# Patient Record
Sex: MALE | Race: WHITE | NOT HISPANIC OR LATINO | Employment: OTHER | URBAN - METROPOLITAN AREA
[De-identification: names, ages, dates, MRNs, and addresses within clinical notes are randomized per-mention and may not be internally consistent; named-entity substitution may affect disease eponyms.]

---

## 2022-12-13 ENCOUNTER — APPOINTMENT (OUTPATIENT)
Dept: GENERAL RADIOLOGY | Facility: HOSPITAL | Age: 67
End: 2022-12-13

## 2022-12-13 ENCOUNTER — APPOINTMENT (OUTPATIENT)
Dept: CT IMAGING | Facility: HOSPITAL | Age: 67
End: 2022-12-13

## 2022-12-13 ENCOUNTER — HOSPITAL ENCOUNTER (INPATIENT)
Facility: HOSPITAL | Age: 67
LOS: 2 days | Discharge: HOME | End: 2022-12-15
Attending: EMERGENCY MEDICINE | Admitting: INTERNAL MEDICINE

## 2022-12-13 DIAGNOSIS — S00.83XA CONTUSION OF FACE, INITIAL ENCOUNTER: ICD-10-CM

## 2022-12-13 DIAGNOSIS — I48.91 NEW ONSET A-FIB: ICD-10-CM

## 2022-12-13 DIAGNOSIS — R55 SYNCOPE, UNSPECIFIED SYNCOPE TYPE: Primary | ICD-10-CM

## 2022-12-13 DIAGNOSIS — I10 HYPERTENSION, UNSPECIFIED TYPE: ICD-10-CM

## 2022-12-13 DIAGNOSIS — I48.91 ATRIAL FIBRILLATION WITH RVR: ICD-10-CM

## 2022-12-13 DIAGNOSIS — I60.9 SAH (SUBARACHNOID HEMORRHAGE): ICD-10-CM

## 2022-12-13 DIAGNOSIS — S06.5XAA SDH (SUBDURAL HEMATOMA): ICD-10-CM

## 2022-12-13 DIAGNOSIS — S09.90XA CLOSED HEAD INJURY, INITIAL ENCOUNTER: ICD-10-CM

## 2022-12-13 LAB
ALBUMIN SERPL-MCNC: 4.7 G/DL (ref 3.5–5.2)
ALBUMIN/GLOB SERPL: 1.3 G/DL
ALP SERPL-CCNC: 75 U/L (ref 39–117)
ALT SERPL W P-5'-P-CCNC: 19 U/L (ref 1–41)
ANION GAP SERPL CALCULATED.3IONS-SCNC: 10.5 MMOL/L (ref 5–15)
APTT PPP: 26.8 SECONDS (ref 22.7–35.4)
AST SERPL-CCNC: 24 U/L (ref 1–40)
BASOPHILS # BLD AUTO: 0.09 10*3/MM3 (ref 0–0.2)
BASOPHILS NFR BLD AUTO: 1.2 % (ref 0–1.5)
BILIRUB SERPL-MCNC: 0.8 MG/DL (ref 0–1.2)
BUN SERPL-MCNC: 17 MG/DL (ref 8–23)
BUN/CREAT SERPL: 13.8 (ref 7–25)
CALCIUM SPEC-SCNC: 9.1 MG/DL (ref 8.6–10.5)
CHLORIDE SERPL-SCNC: 103 MMOL/L (ref 98–107)
CO2 SERPL-SCNC: 23.5 MMOL/L (ref 22–29)
CREAT SERPL-MCNC: 1.23 MG/DL (ref 0.76–1.27)
DEPRECATED RDW RBC AUTO: 49.4 FL (ref 37–54)
EGFRCR SERPLBLD CKD-EPI 2021: 64.7 ML/MIN/1.73
EOSINOPHIL # BLD AUTO: 0.24 10*3/MM3 (ref 0–0.4)
EOSINOPHIL NFR BLD AUTO: 3.1 % (ref 0.3–6.2)
ERYTHROCYTE [DISTWIDTH] IN BLOOD BY AUTOMATED COUNT: 13.1 % (ref 12.3–15.4)
GLOBULIN UR ELPH-MCNC: 3.6 GM/DL
GLUCOSE SERPL-MCNC: 105 MG/DL (ref 65–99)
HCT VFR BLD AUTO: 44.7 % (ref 37.5–51)
HGB BLD-MCNC: 16.2 G/DL (ref 13–17.7)
IMM GRANULOCYTES # BLD AUTO: 0.05 10*3/MM3 (ref 0–0.05)
IMM GRANULOCYTES NFR BLD AUTO: 0.7 % (ref 0–0.5)
INR PPP: 1.04 (ref 0.9–1.1)
LYMPHOCYTES # BLD AUTO: 2.37 10*3/MM3 (ref 0.7–3.1)
LYMPHOCYTES NFR BLD AUTO: 31.1 % (ref 19.6–45.3)
MAGNESIUM SERPL-MCNC: 2.4 MG/DL (ref 1.6–2.4)
MCH RBC QN AUTO: 36.6 PG (ref 26.6–33)
MCHC RBC AUTO-ENTMCNC: 36.2 G/DL (ref 31.5–35.7)
MCV RBC AUTO: 100.9 FL (ref 79–97)
MONOCYTES # BLD AUTO: 0.9 10*3/MM3 (ref 0.1–0.9)
MONOCYTES NFR BLD AUTO: 11.8 % (ref 5–12)
NEUTROPHILS NFR BLD AUTO: 3.97 10*3/MM3 (ref 1.7–7)
NEUTROPHILS NFR BLD AUTO: 52.1 % (ref 42.7–76)
NRBC BLD AUTO-RTO: 0 /100 WBC (ref 0–0.2)
PLATELET # BLD AUTO: 253 10*3/MM3 (ref 140–450)
PMV BLD AUTO: 10.7 FL (ref 6–12)
POTASSIUM SERPL-SCNC: 4.1 MMOL/L (ref 3.5–5.2)
PROT SERPL-MCNC: 8.3 G/DL (ref 6–8.5)
PROTHROMBIN TIME: 13.7 SECONDS (ref 11.7–14.2)
QT INTERVAL: 318 MS
RBC # BLD AUTO: 4.43 10*6/MM3 (ref 4.14–5.8)
SODIUM SERPL-SCNC: 137 MMOL/L (ref 136–145)
T4 FREE SERPL-MCNC: 1.32 NG/DL (ref 0.93–1.7)
TROPONIN T SERPL-MCNC: <0.01 NG/ML (ref 0–0.03)
TSH SERPL DL<=0.05 MIU/L-ACNC: 3.69 UIU/ML (ref 0.27–4.2)
WBC NRBC COR # BLD: 7.62 10*3/MM3 (ref 3.4–10.8)

## 2022-12-13 PROCEDURE — 85610 PROTHROMBIN TIME: CPT | Performed by: EMERGENCY MEDICINE

## 2022-12-13 PROCEDURE — 84443 ASSAY THYROID STIM HORMONE: CPT | Performed by: EMERGENCY MEDICINE

## 2022-12-13 PROCEDURE — 84439 ASSAY OF FREE THYROXINE: CPT | Performed by: EMERGENCY MEDICINE

## 2022-12-13 PROCEDURE — 84484 ASSAY OF TROPONIN QUANT: CPT | Performed by: EMERGENCY MEDICINE

## 2022-12-13 PROCEDURE — 83735 ASSAY OF MAGNESIUM: CPT | Performed by: EMERGENCY MEDICINE

## 2022-12-13 PROCEDURE — 99285 EMERGENCY DEPT VISIT HI MDM: CPT

## 2022-12-13 PROCEDURE — 85730 THROMBOPLASTIN TIME PARTIAL: CPT | Performed by: EMERGENCY MEDICINE

## 2022-12-13 PROCEDURE — 70450 CT HEAD/BRAIN W/O DYE: CPT

## 2022-12-13 PROCEDURE — 80053 COMPREHEN METABOLIC PANEL: CPT | Performed by: EMERGENCY MEDICINE

## 2022-12-13 PROCEDURE — 90715 TDAP VACCINE 7 YRS/> IM: CPT | Performed by: EMERGENCY MEDICINE

## 2022-12-13 PROCEDURE — 90471 IMMUNIZATION ADMIN: CPT | Performed by: EMERGENCY MEDICINE

## 2022-12-13 PROCEDURE — 93005 ELECTROCARDIOGRAM TRACING: CPT | Performed by: EMERGENCY MEDICINE

## 2022-12-13 PROCEDURE — 85025 COMPLETE CBC W/AUTO DIFF WBC: CPT | Performed by: EMERGENCY MEDICINE

## 2022-12-13 PROCEDURE — 36415 COLL VENOUS BLD VENIPUNCTURE: CPT

## 2022-12-13 PROCEDURE — G0378 HOSPITAL OBSERVATION PER HR: HCPCS

## 2022-12-13 PROCEDURE — 25010000002 MAGNESIUM SULFATE 2 GM/50ML SOLUTION: Performed by: EMERGENCY MEDICINE

## 2022-12-13 PROCEDURE — 25010000002 TETANUS-DIPHTH-ACELL PERTUSSIS 5-2.5-18.5 LF-MCG/0.5 SUSPENSION PREFILLED SYRINGE: Performed by: EMERGENCY MEDICINE

## 2022-12-13 PROCEDURE — 93010 ELECTROCARDIOGRAM REPORT: CPT | Performed by: STUDENT IN AN ORGANIZED HEALTH CARE EDUCATION/TRAINING PROGRAM

## 2022-12-13 PROCEDURE — 71045 X-RAY EXAM CHEST 1 VIEW: CPT

## 2022-12-13 RX ORDER — SODIUM CHLORIDE 0.9 % (FLUSH) 0.9 %
10 SYRINGE (ML) INJECTION AS NEEDED
Status: DISCONTINUED | OUTPATIENT
Start: 2022-12-13 | End: 2022-12-13

## 2022-12-13 RX ORDER — MAGNESIUM SULFATE HEPTAHYDRATE 40 MG/ML
2 INJECTION, SOLUTION INTRAVENOUS ONCE
Status: COMPLETED | OUTPATIENT
Start: 2022-12-13 | End: 2022-12-13

## 2022-12-13 RX ORDER — SODIUM CHLORIDE 9 MG/ML
40 INJECTION, SOLUTION INTRAVENOUS AS NEEDED
Status: DISCONTINUED | OUTPATIENT
Start: 2022-12-13 | End: 2022-12-13

## 2022-12-13 RX ORDER — SODIUM CHLORIDE 0.9 % (FLUSH) 0.9 %
10 SYRINGE (ML) INJECTION EVERY 12 HOURS SCHEDULED
Status: DISCONTINUED | OUTPATIENT
Start: 2022-12-13 | End: 2022-12-13

## 2022-12-13 RX ADMIN — MAGNESIUM SULFATE HEPTAHYDRATE 2 G: 2 INJECTION, SOLUTION INTRAVENOUS at 21:02

## 2022-12-13 RX ADMIN — SODIUM CHLORIDE 500 ML: 9 INJECTION, SOLUTION INTRAVENOUS at 21:03

## 2022-12-13 RX ADMIN — METOPROLOL TARTRATE 5 MG: 1 INJECTION, SOLUTION INTRAVENOUS at 21:13

## 2022-12-13 RX ADMIN — TETANUS TOXOID, REDUCED DIPHTHERIA TOXOID AND ACELLULAR PERTUSSIS VACCINE, ADSORBED 0.5 ML: 5; 2.5; 8; 8; 2.5 SUSPENSION INTRAMUSCULAR at 21:03

## 2022-12-13 RX ADMIN — METOPROLOL TARTRATE 25 MG: 25 TABLET, FILM COATED ORAL at 21:03

## 2022-12-13 RX ADMIN — Medication 10 ML: at 23:44

## 2022-12-14 ENCOUNTER — APPOINTMENT (OUTPATIENT)
Dept: CT IMAGING | Facility: HOSPITAL | Age: 67
End: 2022-12-14

## 2022-12-14 ENCOUNTER — APPOINTMENT (OUTPATIENT)
Dept: CARDIOLOGY | Facility: HOSPITAL | Age: 67
End: 2022-12-14

## 2022-12-14 PROBLEM — I60.9 SAH (SUBARACHNOID HEMORRHAGE): Status: ACTIVE | Noted: 2022-12-14

## 2022-12-14 PROBLEM — Z86.16 HISTORY OF COVID-19: Chronic | Status: ACTIVE | Noted: 2022-12-14

## 2022-12-14 PROBLEM — S06.5X1A TRAUMATIC SUBDURAL HEMORRHAGE WITH LOSS OF CONSCIOUSNESS OF 30 MINUTES OR LESS: Status: ACTIVE | Noted: 2022-12-14

## 2022-12-14 PROBLEM — H05.232 PERIORBITAL HEMATOMA OF LEFT EYE: Status: ACTIVE | Noted: 2022-12-14

## 2022-12-14 PROBLEM — S09.90XA CLOSED HEAD INJURY: Status: ACTIVE | Noted: 2022-12-14

## 2022-12-14 LAB
AORTIC ARCH: 2.9 CM
ASCENDING AORTA: 3.2 CM
BH CV ECHO MEAS - ACS: 2.11 CM
BH CV ECHO MEAS - AO MAX PG: 3.5 MMHG
BH CV ECHO MEAS - AO MEAN PG: 1.86 MMHG
BH CV ECHO MEAS - AO ROOT DIAM: 3.4 CM
BH CV ECHO MEAS - AO V2 MAX: 93.9 CM/SEC
BH CV ECHO MEAS - AO V2 VTI: 17.3 CM
BH CV ECHO MEAS - AVA(I,D): 2.22 CM2
BH CV ECHO MEAS - EDV(CUBED): 89.2 ML
BH CV ECHO MEAS - EDV(MOD-SP2): 90 ML
BH CV ECHO MEAS - EDV(MOD-SP4): 100 ML
BH CV ECHO MEAS - EF(MOD-BP): 62.7 %
BH CV ECHO MEAS - EF(MOD-SP2): 63.3 %
BH CV ECHO MEAS - EF(MOD-SP4): 60 %
BH CV ECHO MEAS - ESV(CUBED): 39.8 ML
BH CV ECHO MEAS - ESV(MOD-SP2): 33 ML
BH CV ECHO MEAS - ESV(MOD-SP4): 40 ML
BH CV ECHO MEAS - FS: 23.6 %
BH CV ECHO MEAS - IVS/LVPW: 0.84 CM
BH CV ECHO MEAS - IVSD: 0.75 CM
BH CV ECHO MEAS - LAT PEAK E' VEL: 15 CM/SEC
BH CV ECHO MEAS - LV DIASTOLIC VOL/BSA (35-75): 47.5 CM2
BH CV ECHO MEAS - LV MASS(C)D: 117 GRAMS
BH CV ECHO MEAS - LV MAX PG: 2.05 MMHG
BH CV ECHO MEAS - LV MEAN PG: 1.08 MMHG
BH CV ECHO MEAS - LV SYSTOLIC VOL/BSA (12-30): 19 CM2
BH CV ECHO MEAS - LV V1 MAX: 71.6 CM/SEC
BH CV ECHO MEAS - LV V1 VTI: 12.4 CM
BH CV ECHO MEAS - LVIDD: 4.5 CM
BH CV ECHO MEAS - LVIDS: 3.4 CM
BH CV ECHO MEAS - LVOT AREA: 3.1 CM2
BH CV ECHO MEAS - LVOT DIAM: 1.98 CM
BH CV ECHO MEAS - LVPWD: 0.9 CM
BH CV ECHO MEAS - MED PEAK E' VEL: 11.3 CM/SEC
BH CV ECHO MEAS - MV DEC SLOPE: 587.4 CM/SEC2
BH CV ECHO MEAS - MV DEC TIME: 0.13 MSEC
BH CV ECHO MEAS - MV E MAX VEL: 96.4 CM/SEC
BH CV ECHO MEAS - MV MAX PG: 5.3 MMHG
BH CV ECHO MEAS - MV MEAN PG: 1.74 MMHG
BH CV ECHO MEAS - MV P1/2T: 55.3 MSEC
BH CV ECHO MEAS - MV V2 VTI: 17.2 CM
BH CV ECHO MEAS - MVA(P1/2T): 4 CM2
BH CV ECHO MEAS - MVA(VTI): 2.24 CM2
BH CV ECHO MEAS - PA ACC TIME: 0.11 SEC
BH CV ECHO MEAS - PA PR(ACCEL): 31.5 MMHG
BH CV ECHO MEAS - PA V2 MAX: 69.8 CM/SEC
BH CV ECHO MEAS - QP/QS: 0.58
BH CV ECHO MEAS - RAP SYSTOLE: 8 MMHG
BH CV ECHO MEAS - RV MAX PG: 1.06 MMHG
BH CV ECHO MEAS - RV V1 MAX: 51.4 CM/SEC
BH CV ECHO MEAS - RV V1 VTI: 9.7 CM
BH CV ECHO MEAS - RVOT DIAM: 1.71 CM
BH CV ECHO MEAS - RVSP: 40 MMHG
BH CV ECHO MEAS - SI(MOD-SP2): 27.1 ML/M2
BH CV ECHO MEAS - SI(MOD-SP4): 28.5 ML/M2
BH CV ECHO MEAS - SUP REN AO DIAM: 2.5 CM
BH CV ECHO MEAS - SV(LVOT): 38.4 ML
BH CV ECHO MEAS - SV(MOD-SP2): 57 ML
BH CV ECHO MEAS - SV(MOD-SP4): 60 ML
BH CV ECHO MEAS - SV(RVOT): 22.4 ML
BH CV ECHO MEAS - TAPSE (>1.6): 1.89 CM
BH CV ECHO MEAS - TR MAX PG: 32.8 MMHG
BH CV ECHO MEAS - TR MAX VEL: 286.4 CM/SEC
BH CV ECHO MEASUREMENTS AVERAGE E/E' RATIO: 7.33
BH CV XLRA - RV BASE: 3.5 CM
BH CV XLRA - RV LENGTH: 7.1 CM
BH CV XLRA - RV MID: 2.7 CM
BH CV XLRA - TDI S': 13.2 CM/SEC
GLUCOSE BLDC GLUCOMTR-MCNC: 111 MG/DL (ref 70–130)
GLUCOSE BLDC GLUCOMTR-MCNC: 89 MG/DL (ref 70–130)
GLUCOSE BLDC GLUCOMTR-MCNC: 96 MG/DL (ref 70–130)
MAXIMAL PREDICTED HEART RATE: 154 BPM
SINUS: 3.8 CM
STJ: 3 CM
STRESS TARGET HR: 131 BPM

## 2022-12-14 PROCEDURE — 25010000002 PERFLUTREN (DEFINITY) 8.476 MG IN SODIUM CHLORIDE (PF) 0.9 % 10 ML INJECTION: Performed by: INTERNAL MEDICINE

## 2022-12-14 PROCEDURE — 82962 GLUCOSE BLOOD TEST: CPT

## 2022-12-14 PROCEDURE — 94799 UNLISTED PULMONARY SVC/PX: CPT

## 2022-12-14 PROCEDURE — 93306 TTE W/DOPPLER COMPLETE: CPT

## 2022-12-14 PROCEDURE — 93306 TTE W/DOPPLER COMPLETE: CPT | Performed by: INTERNAL MEDICINE

## 2022-12-14 PROCEDURE — 99222 1ST HOSP IP/OBS MODERATE 55: CPT | Performed by: INTERNAL MEDICINE

## 2022-12-14 PROCEDURE — 99222 1ST HOSP IP/OBS MODERATE 55: CPT | Performed by: NURSE PRACTITIONER

## 2022-12-14 PROCEDURE — 70450 CT HEAD/BRAIN W/O DYE: CPT

## 2022-12-14 RX ORDER — POLYETHYLENE GLYCOL 3350 17 G/17G
17 POWDER, FOR SOLUTION ORAL DAILY PRN
Status: DISCONTINUED | OUTPATIENT
Start: 2022-12-14 | End: 2022-12-15 | Stop reason: HOSPADM

## 2022-12-14 RX ORDER — ALUMINA, MAGNESIA, AND SIMETHICONE 2400; 2400; 240 MG/30ML; MG/30ML; MG/30ML
15 SUSPENSION ORAL EVERY 6 HOURS PRN
Status: DISCONTINUED | OUTPATIENT
Start: 2022-12-14 | End: 2022-12-15 | Stop reason: HOSPADM

## 2022-12-14 RX ORDER — AMOXICILLIN 250 MG
2 CAPSULE ORAL 2 TIMES DAILY
Status: DISCONTINUED | OUTPATIENT
Start: 2022-12-14 | End: 2022-12-15 | Stop reason: HOSPADM

## 2022-12-14 RX ORDER — BENZONATATE 100 MG/1
100 CAPSULE ORAL 3 TIMES DAILY PRN
Status: DISCONTINUED | OUTPATIENT
Start: 2022-12-14 | End: 2022-12-15 | Stop reason: HOSPADM

## 2022-12-14 RX ORDER — TRIAMCINOLONE ACETONIDE 1 MG/G
1 CREAM TOPICAL EVERY 12 HOURS SCHEDULED
Status: DISCONTINUED | OUTPATIENT
Start: 2022-12-14 | End: 2022-12-15 | Stop reason: HOSPADM

## 2022-12-14 RX ORDER — SODIUM CHLORIDE 0.9 % (FLUSH) 0.9 %
1-10 SYRINGE (ML) INJECTION AS NEEDED
Status: DISCONTINUED | OUTPATIENT
Start: 2022-12-14 | End: 2022-12-15 | Stop reason: HOSPADM

## 2022-12-14 RX ORDER — ONDANSETRON 2 MG/ML
4 INJECTION INTRAMUSCULAR; INTRAVENOUS EVERY 6 HOURS PRN
Status: DISCONTINUED | OUTPATIENT
Start: 2022-12-14 | End: 2022-12-15 | Stop reason: HOSPADM

## 2022-12-14 RX ORDER — DILTIAZEM HCL IN NACL,ISO-OSM 125 MG/125
5-15 PLASTIC BAG, INJECTION (ML) INTRAVENOUS
Status: DISCONTINUED | OUTPATIENT
Start: 2022-12-14 | End: 2022-12-14

## 2022-12-14 RX ORDER — SODIUM CHLORIDE 0.9 % (FLUSH) 0.9 %
10 SYRINGE (ML) INJECTION EVERY 12 HOURS SCHEDULED
Status: DISCONTINUED | OUTPATIENT
Start: 2022-12-14 | End: 2022-12-15 | Stop reason: HOSPADM

## 2022-12-14 RX ORDER — BISACODYL 10 MG
10 SUPPOSITORY, RECTAL RECTAL DAILY PRN
Status: DISCONTINUED | OUTPATIENT
Start: 2022-12-14 | End: 2022-12-15 | Stop reason: HOSPADM

## 2022-12-14 RX ORDER — BISACODYL 5 MG/1
5 TABLET, DELAYED RELEASE ORAL DAILY PRN
Status: DISCONTINUED | OUTPATIENT
Start: 2022-12-14 | End: 2022-12-15 | Stop reason: HOSPADM

## 2022-12-14 RX ORDER — SODIUM CHLORIDE 9 MG/ML
40 INJECTION, SOLUTION INTRAVENOUS AS NEEDED
Status: DISCONTINUED | OUTPATIENT
Start: 2022-12-14 | End: 2022-12-15 | Stop reason: HOSPADM

## 2022-12-14 RX ADMIN — METOPROLOL TARTRATE 5 MG: 1 INJECTION, SOLUTION INTRAVENOUS at 07:38

## 2022-12-14 RX ADMIN — PERFLUTREN 2 ML: 6.52 INJECTION, SUSPENSION INTRAVENOUS at 16:08

## 2022-12-14 RX ADMIN — NICARDIPINE HYDROCHLORIDE 5 MG/HR: 25 INJECTION, SOLUTION INTRAVENOUS at 02:03

## 2022-12-14 RX ADMIN — DILTIAZEM HYDROCHLORIDE 5 MG/HR: 5 INJECTION, SOLUTION INTRAVENOUS at 12:03

## 2022-12-14 RX ADMIN — TRIAMCINOLONE ACETONIDE 1 APPLICATION: 1 CREAM TOPICAL at 21:34

## 2022-12-14 RX ADMIN — Medication 10 ML: at 21:34

## 2022-12-14 RX ADMIN — Medication 10 ML: at 08:18

## 2022-12-14 RX ADMIN — METOPROLOL TARTRATE 5 MG: 1 INJECTION, SOLUTION INTRAVENOUS at 03:21

## 2022-12-14 RX ADMIN — DILTIAZEM HYDROCHLORIDE 90 MG: 60 TABLET, FILM COATED ORAL at 18:18

## 2022-12-14 RX ADMIN — DILTIAZEM HYDROCHLORIDE 90 MG: 60 TABLET, FILM COATED ORAL at 23:53

## 2022-12-14 RX ADMIN — METOPROLOL TARTRATE 5 MG: 1 INJECTION, SOLUTION INTRAVENOUS at 11:35

## 2022-12-14 NOTE — CONSULTS
"[facility] NEUROSURGERY CONSULT NOTE    Patient name: Emmanuel Huizar  Referring Provider: Dr. Li  Reason for Consultation: MarleniMATT Price    Patient Care Team:  Provider, No Known as PCP - General    Chief complaint: Price Chávez    Subjective .     History of present illness:    Patient is a 67 y.o. male with no prior medical history and in his usual state of health who presents to [facility] ED via EMS after syncopal episode with closed head injury.  Patient's wife states that they were leaving dinner and walking through the parking lot when the patient \"just face planted\" on the ground. The patient has no recollection of the incident nor does he recall if he had any pre-syncopal symptoms. In the ED, he was noted to be in A-fib with RVR.  Initial head CT done in the ED showed a 2 mm left frontal subdural hematoma, as well as a trace amount of subarachnoid hemorrhage within the left parietal lobe. He denies any antiplatelet or anticoagulant medications.     Review of Systems  Review of Systems   Constitutional: Negative.    HENT: Negative.    Eyes: Positive for visual disturbance.   Respiratory: Negative for chest tightness and shortness of breath.    Cardiovascular: Negative for chest pain.   Gastrointestinal: Negative.    Endocrine: Negative.    Genitourinary: Negative.    Musculoskeletal: Negative.    Skin: Positive for color change and wound.   Allergic/Immunologic: Negative.    Neurological: Positive for syncope.   Hematological: Negative.    Psychiatric/Behavioral: Negative.        History  PAST MEDICAL HISTORY  No past medical history on file.    PAST SURGICAL HISTORY  No past surgical history on file.    FAMILY HISTORY  No family history on file.    SOCIAL HISTORY  Social History     Tobacco Use   • Smoking status: Never     Passive exposure: Never   • Smokeless tobacco: Never   Vaping Use   • Vaping Use: Never used   Substance Use Topics   • Alcohol use: Yes     Alcohol/week: 3.0 standard " drinks     Types: 3 Shots of liquor per week   • Drug use: Never       Unknown   Lives at home with his wife    Allergies:  Patient has no known allergies.    MEDICATIONS:  No medications prior to admission.       Objective     Results Review:  LABS:    Admission on 12/13/2022   Component Date Value Ref Range Status   • QT Interval 12/13/2022 318  ms Final   • Protime 12/13/2022 13.7  11.7 - 14.2 Seconds Final   • INR 12/13/2022 1.04  0.90 - 1.10 Final   • PTT 12/13/2022 26.8  22.7 - 35.4 seconds Final   • Glucose 12/13/2022 105 (H)  65 - 99 mg/dL Final   • BUN 12/13/2022 17  8 - 23 mg/dL Final   • Creatinine 12/13/2022 1.23  0.76 - 1.27 mg/dL Final   • Sodium 12/13/2022 137  136 - 145 mmol/L Final   • Potassium 12/13/2022 4.1  3.5 - 5.2 mmol/L Final   • Chloride 12/13/2022 103  98 - 107 mmol/L Final   • CO2 12/13/2022 23.5  22.0 - 29.0 mmol/L Final   • Calcium 12/13/2022 9.1  8.6 - 10.5 mg/dL Final   • Total Protein 12/13/2022 8.3  6.0 - 8.5 g/dL Final   • Albumin 12/13/2022 4.70  3.50 - 5.20 g/dL Final   • ALT (SGPT) 12/13/2022 19  1 - 41 U/L Final   • AST (SGOT) 12/13/2022 24  1 - 40 U/L Final   • Alkaline Phosphatase 12/13/2022 75  39 - 117 U/L Final   • Total Bilirubin 12/13/2022 0.8  0.0 - 1.2 mg/dL Final   • Globulin 12/13/2022 3.6  gm/dL Final   • A/G Ratio 12/13/2022 1.3  g/dL Final   • BUN/Creatinine Ratio 12/13/2022 13.8  7.0 - 25.0 Final   • Anion Gap 12/13/2022 10.5  5.0 - 15.0 mmol/L Final   • eGFR 12/13/2022 64.7  >60.0 mL/min/1.73 Final    National Kidney Foundation and American Society of Nephrology (ASN) Task Force recommended calculation based on the Chronic Kidney Disease Epidemiology Collaboration (CKD-EPI) equation refit without adjustment for race.   • Troponin T 12/13/2022 <0.010  0.000 - 0.030 ng/mL Final   • Free T4 12/13/2022 1.32  0.93 - 1.70 ng/dL Final   • TSH 12/13/2022 3.690  0.270 - 4.200 uIU/mL Final   • Magnesium 12/13/2022 2.4  1.6 - 2.4 mg/dL Final   • WBC 12/13/2022 7.62   3.40 - 10.80 10*3/mm3 Final   • RBC 12/13/2022 4.43  4.14 - 5.80 10*6/mm3 Final   • Hemoglobin 12/13/2022 16.2  13.0 - 17.7 g/dL Final   • Hematocrit 12/13/2022 44.7  37.5 - 51.0 % Final   • MCV 12/13/2022 100.9 (H)  79.0 - 97.0 fL Final   • MCH 12/13/2022 36.6 (H)  26.6 - 33.0 pg Final   • MCHC 12/13/2022 36.2 (H)  31.5 - 35.7 g/dL Final   • RDW 12/13/2022 13.1  12.3 - 15.4 % Final   • RDW-SD 12/13/2022 49.4  37.0 - 54.0 fl Final   • MPV 12/13/2022 10.7  6.0 - 12.0 fL Final   • Platelets 12/13/2022 253  140 - 450 10*3/mm3 Final   • Neutrophil % 12/13/2022 52.1  42.7 - 76.0 % Final   • Lymphocyte % 12/13/2022 31.1  19.6 - 45.3 % Final   • Monocyte % 12/13/2022 11.8  5.0 - 12.0 % Final   • Eosinophil % 12/13/2022 3.1  0.3 - 6.2 % Final   • Basophil % 12/13/2022 1.2  0.0 - 1.5 % Final   • Immature Grans % 12/13/2022 0.7 (H)  0.0 - 0.5 % Final   • Neutrophils, Absolute 12/13/2022 3.97  1.70 - 7.00 10*3/mm3 Final   • Lymphocytes, Absolute 12/13/2022 2.37  0.70 - 3.10 10*3/mm3 Final   • Monocytes, Absolute 12/13/2022 0.90  0.10 - 0.90 10*3/mm3 Final   • Eosinophils, Absolute 12/13/2022 0.24  0.00 - 0.40 10*3/mm3 Final   • Basophils, Absolute 12/13/2022 0.09  0.00 - 0.20 10*3/mm3 Final   • Immature Grans, Absolute 12/13/2022 0.05  0.00 - 0.05 10*3/mm3 Final   • nRBC 12/13/2022 0.0  0.0 - 0.2 /100 WBC Final   • Glucose 12/14/2022 96  70 - 130 mg/dL Final    Meter: [id number] : [id number] Gamal Vaca RN   • Glucose 12/14/2022 89  70 - 130 mg/dL Final    Meter: [id number] : [id number] White Laci NA       DIAGNOSTICS:  CT Head Without Contrast    Result Date: 12/14/2022  No significant interval change in previously described left frontoparietal subdural hematoma and trace parietal subarachnoid hemorrhage.  Radiation dose reduction techniques were utilized, including automated exposure control and exposure modulation based on body size.  This report was finalized on 12/14/2022 5:31 AM by Dr. Nae Campo M.D.       CT Head Without Contrast    Result Date: 12/13/2022  2 mm left frontal subdural hematoma, as well as a trace amount of subarachnoid hemorrhage within the left parietal lobe. There is no midline shift.  FINDINGS were called to Dr. Li at 10:35 PM.  Radiation dose reduction techniques were utilized, including automated exposure control and exposure modulation based on body size.  This report was finalized on 12/13/2022 10:37 PM by Dr. Nae Campo M.D.      XR Chest 1 View    Result Date: 12/13/2022  No focal pulmonary consolidation. Borderline heart size. Tortuous aorta. Follow-up as clinical indications persist.  This report was finalized on 12/13/2022 8:54 PM by Dr. Brandon Dotson M.D.          Results Review:   I reviewed the patient's new clinical results.  I personally viewed and interpreted the patient's clinical data and CT head imaging and discussed the patient with Dr. Blackburn.    Vital Signs   Temp:  [97.3 °F (36.3 °C)-98.5 °F (36.9 °C)] 97.6 °F (36.4 °C)  Heart Rate:  [] 96  Resp:  [14-20] 16  BP: (127-175)/() 160/120    Physical Exam:  Physical Exam  Vitals reviewed.   Constitutional:       General: He is awake. He is not in acute distress.     Appearance: Normal appearance. He is well-developed. He is not ill-appearing, toxic-appearing or diaphoretic.   HENT:      Head: Normocephalic and atraumatic.      Nose: Nose normal.      Mouth/Throat:      Mouth: Mucous membranes are moist.   Eyes:      Extraocular Movements: Extraocular movements intact.      Pupils: Pupils are equal, round, and reactive to light.   Cardiovascular:      Rate and Rhythm: Rhythm irregular.   Pulmonary:      Effort: Pulmonary effort is normal.   Musculoskeletal:         General: Normal range of motion.      Cervical back: Normal range of motion and neck supple.   Skin:     General: Skin is warm and dry.      Findings: Bruising present.      Comments: -Significant left periorbital bruising and swelling    Neurological:      General: No focal deficit present.      Mental Status: He is alert and oriented to person, place, and time.      GCS: GCS eye subscore is 4. GCS verbal subscore is 5. GCS motor subscore is 6.      Cranial Nerves: Cranial nerves 2-12 are intact.      Motor: Motor strength is normal.      Coordination: Finger-Nose-Finger Test and Heel to Shin Test normal.   Psychiatric:         Mood and Affect: Mood normal.         Speech: Speech normal.         Behavior: Behavior normal. Behavior is cooperative.         Thought Content: Thought content normal.         Judgment: Judgment normal.       Neurologic Exam     Mental Status   Oriented to person, place, and time.   Attention: normal. Concentration: normal.   Speech: speech is normal   Level of consciousness: alert  Knowledge: good.   Normal comprehension.     Cranial Nerves   Cranial nerves II through XII intact.     CN III, IV, VI   Pupils are equal, round, and reactive to light.    Motor Exam   Muscle bulk: normal  Overall muscle tone: normal    Strength   Strength 5/5 throughout.     Sensory Exam   Light touch normal.     Gait, Coordination, and Reflexes     Coordination   Finger to nose coordination: normal  Heel to shin coordination: normal  -Gait not tested 2/2 increased falls risk       Assessment & Plan       Atrial fibrillation ([facility])    Syncope, unspecified syncope type    SAH (subarachnoid hemorrhage) ([facility])    Traumatic subdural hemorrhage with loss of consciousness of 30 minutes or less ([facility])    67-year-old patient presented to the ED after syncopal episode causing closed head trauma most likely attributed to cardiac arrhythmia.     Initial CT head imaging revealed a small left frontal subdural hematoma along with trace left parietal subarachnoid hemorrhage.  Repeat CT head imaging this morning was stable.  Patient is neurologically intact.  He is not on any anticoagulant or antiplatelet medications.  At this point, both the small subdural and  "trace subarachnoid hemorrhage will most likely reabsorb on its own, but recommend follow-up with us in clinic in 10-14 days with repeat CT head imaging at this time.  In the meantime, recommend holding all anticoagulant or antiplatelet medications until follow-up CT head imaging can be obtained and reviewed.  Will discuss initiation of these medications at that time if they are warranted.    PLAN:   -repeat head CT stable  -Follow-up with us in clinic in 10-14 days with repeat head CT  -No AC/AP medications until follow-up head CT complete    I discussed the patient's findings and my recommendations with patient, family, nursing staff and Dr. Alexey Velázquez, APRN  12/14/22  12:42 EST    \"Dictated utilizing Dragon dictation\".    "

## 2022-12-14 NOTE — H&P
[facility]  [phone]  Dr. Kevin Terrazas      Subjective   LOS: 0 days     I was asked to admit this patient due to findings of small subarachnoid and subdural hemorrhage on CT head.  Patient has no prior significant medical history.  He was coming out of dizzy and passed out.  He hit the side of his head.  Brought into the emergency room.  Has a large periorbital hematoma on the left eye.  Also noted to be A. fib RVR.  CT head shows a small subarachnoid and subdural hemorrhage.  We have therefore asked to admit him to the ICU.  He is not on any anticoagulation or any antiplatelet agents.  He states he has whitecoat hypertension and currently his blood pressure is elevated.  He states his brothers both have A. fib and had to have ablations in the past.  He himself there is no personal history of A. fib.  He denies any episodes of dizziness lightheadedness or syncope prior to this.  No prior history of stroke.  He does have mild orthostatic changes when he tries to sit up or stand up quickly.  He is a never smoker.  He has about 2 alcoholic beverages 3 times a week.    Emmanuel Huizar  has no history on file for alcohol use.,  has no history on file for tobacco use.     Past Hx:  has no past medical history on file.  Surg Hx:  has no past surgical history on file.  FH: family history is not on file.  SH:  has no history on file for tobacco use, alcohol use, and drug use.    No medications prior to admission.     No Known Allergies    Review of Systems   Constitutional: Negative for chills and fever.   HENT: Negative for congestion and sore throat.    Respiratory: Negative for cough and shortness of breath.    Cardiovascular: Negative for chest pain and leg swelling.   Gastrointestinal: Negative for abdominal pain, nausea and vomiting.   Genitourinary: Negative for dysuria and hematuria.   Musculoskeletal: Negative for arthralgias and back pain.   Skin: Negative for pallor and rash.   Neurological:  Positive for syncope. Negative for seizures and headaches.   Psychiatric/Behavioral: Negative for agitation and confusion.     Vital Signs past 24hrs  BP range: BP: (140-175)/() 140/99  Pulse range: Heart Rate:  [] 99  Resp rate range: Resp:  [18] 18  Temp range: Temp (24hrs), Av.7 °F (36.5 °C), Min:97.3 °F (36.3 °C), Max:98.1 °F (36.7 °C)    Oxygen range: SpO2:  [89 %-98 %] 97 %;  ;   Device (Oxygen Therapy): room air  90.7 kg (200 lb); Body mass index is 25 kg/m².  No intake/output data recorded.    Adult male seen sitting up in bed.  Well-built well-nourished.  No acute distress.  Obvious periorbital hematoma left eye and eyelid.  Otherwise pupils appear to be equal and reactive to light.  Oropharynx moist class II Mallampati airway.  No posterior pharyngeal discharge.  Nasopharynx without discharge.  JVP not elevated trachea midline thyroid not enlarged.  Lungs reveal bilateral air entry clear to auscultation no rales rhonchi wheeze.  Percussion note resonant chest expansion equal no chest wall deformity or tenderness.  Heart examination S1-S2 present rhythm regular no murmurs.  No edema lower extremities.  Abdomen is soft nontender bowel sounds present no liver spleen enlargement.  No peripheral cyanosis clubbing.  Moves all 4 extremities sensorimotor intact.  No cervical, axillary, inguinal adenopathy.    Results Review:    I have reviewed the laboratory and imaging data from current admission. My annotations are as noted in assessment and plan.    Medication Review:  I have reviewed the current MAR. My annotations are as noted in assessment and plan.    Plan   PCCM Problems  New onset atrial fibrillation with RVR  Left eye periorbital hematoma  Small SDH and SAH traumatic      Plan of Treatment  The SDH and SAH appear to be quite tiny.  ER has placed a call to neurosurgery.  Will monitor in ICU and check CT head again in the morning.  Control blood pressure.  Likely no intervention.    Left  eye periorbital hematoma noted.  Likely symptomatic management.  We will see if ophthalmology consult is possible in the morning.    New onset A. fib with RVR.  Will require cardiology consultation and input.  Eventually will require anticoagulation and possible other intervention.  Can use IV metoprolol for rate control.    Electronically signed by Kevin Terrazas MD, 12/13/22, 11:37 PM EST.      Part of this note may be an electronic transcription/translation of spoken language to printed text using the Dragon Dictation System.

## 2022-12-14 NOTE — CONSULTS
Patient Name: Emmanuel Huizar  :1955  67 y.o.    Date of Admission: 2022  Date of Consultation:  22  Encounter Provider: Demar Elder III, MD  Place of Service: [facility] CARDIOLOGY  Referring Provider: Ronn Aldana MD  Patient Care Team:  Provider, No Known as PCP - General      Chief complaint: syncope and closed head injury    History of Present Illness:  Emmanuel Huizar is a 67 year old male with no significant medical history. He reported he has not seen a physician in five years.    He presented after a fall yesterday.  He had dinner at [employer] and then fell to the ground in the parking lot.  He has amnesia of the event so he is not sure if he passed out before he fell.  He struck his head, treating intracranial bleed and has been seen by neurosurgery.  He does not recall actually even been in the restaurant.    This patient has no known cardiac history.  This patient has no history of coronary artery disease, congestive heart failure, rheumatic fever, rheumatic heart disease, congenital heart disease or heart murmur.  This patient has never required invasive cardiovascular evaluation.    He is never felt any sensation of palpitations tachycardia.  He has not felt any sensation of palpitations tachycardia since he arrived at [facility] even though he was in A. fib with RVR.  Heart rate was 162 on arrival.    Mr. Huizar had a CT of the head upon arrival and it showed traces of subdural hemorrhage and subarachnoid hemorrhage, so he was transferred to ICU for monitoring. We have been consulted to manage his atrial fibrillation. His heart rate was initially in the 160s and he received 5 mg of IV lopressor and 25 mg of PO lopressor, which improved his rate to low 100s.    Previous testing:  CXR 2022:  FINDINGS: The heart size is borderline. Aorta is tortuous. Pulmonary  vasculature is unremarkable. No focal pulmonary consolidation,  pleural  effusion, or pneumothorax. Gas-filled bowel is apparent under the left  hemidiaphragm, only partly included, correlate clinically. No acute  osseous process.     IMPRESSION:  No focal pulmonary consolidation. Borderline heart size.  Tortuous aorta. Follow-up as clinical indications persist.     CT head w/o 12/14/2022:  FINDINGS:  As was previously discussed, there is a small left frontoparietal mildly  hyperdense subdural hematoma. Given that it is only mildly hyperdense,  this may be more subacute. It measures approximately 3 mm in thickness,  which is unchanged when compared to the earlier exam. There is no  midline shift. Trace amount of subarachnoid hemorrhage is noted within  the left parietal lobe appears to be stable in volume when compared to  the prior exam. No new areas of hemorrhage are seen. There is mild  atrophy. There is periventricular and deep white matter microangiopathic  change. There is a left supraorbital and periorbital hematoma. This  extends more posteriorly on the current exam, although the thickness is  similar to the prior study.     IMPRESSION:  No significant interval change in previously described left  frontoparietal subdural hematoma and trace parietal subarachnoid  hemorrhage.     CT head w/o 12/13/2022:  FINDINGS:  There is trace mildly hyperdense material overlying the left frontal  lobe. Appearance is concerning for a small subdural hematoma. In  addition, the patient is noted to have a small amount of subarachnoid  hemorrhage within the left parietal lobe. This is best seen on images  33-35 of the axial series. There is mild atrophy. There is no midline  shift or mass effect. No obvious calvarial fracture is seen. There is  left periorbital soft tissue swelling. Left globe appears intact. No  definite post septal extension is seen. Paranasal sinuses and mastoid  air cells appear clear.     IMPRESSION:  2 mm left frontal subdural hematoma, as well as a trace amount  of  subarachnoid hemorrhage within the left parietal lobe. There is no  midline shift.      No past medical history on file.    No past surgical history on file.      Prior to Admission medications    Not on File       No Known Allergies    Social History     Socioeconomic History   • Marital status:    Tobacco Use   • Smoking status: Never     Passive exposure: Never   • Smokeless tobacco: Never   Vaping Use   • Vaping Use: Never used   Substance and Sexual Activity   • Alcohol use: Yes     Alcohol/week: 3.0 standard drinks     Types: 3 Shots of liquor per week   • Drug use: Never       No family history on file.    REVIEW OF SYSTEMS:   All systems reviewed.  Pertinent positives identified in HPI.  All other systems are negative.      Objective:     Vitals:    12/14/22 1109 12/14/22 1130 12/14/22 1200 12/14/22 1300   BP:  (!) 152/110 (!) 160/120 146/96   BP Location:       Patient Position:       Pulse:  106 96 89   Resp:       Temp: 97.6 °F (36.4 °C)      TempSrc: Oral      SpO2:  98% 96% 96%   Weight:       Height:         Body mass index is 22.68 kg/m².    General Appearance:    Alert, cooperative, in no acute distress   Head:   Left facial and periorbital hemorrhage   Eyes:            Lids and lashes normal, conjunctivae and sclerae normal, no icterus, no pallor, corneas clear,   Ears:    Ears appear intact with no abnormalities noted   Throat:   No oral lesions, no thrush, oral mucosa moist   Neck:   No adenopathy, supple, trachea midline, no thyromegaly, no carotid bruit, no JVD   Back:     No kyphosis present, no scoliosis present, no skin lesions, erythema or scars, no tenderness to percussion or palpation, range of motion normal   Lungs:     Clear to auscultation, respirations regular, even and unlabored    Heart:    irregular rhythm and normal rate, normal S1 and S2, no murmur, no gallop, no rub, no click   Chest Wall:    No abnormalities observed   Abdomen:     Normal bowel sounds, no masses, no  organomegaly, soft, nontender, nondistended, no guarding, no rebound  tenderness   Extremities:   Moves all extremities well, no edema, no cyanosis, no redness   Pulses:   Pulses palpable and equal bilaterally. Normal radial, carotid, femoral, dorsalis pedis and posterior tibial pulses bilaterally. Normal abdominal aorta   Skin:  Psychiatric:   No bleeding, bruising or rash    Alert and oriented x 3, normal mood and affect   Lab Review:     Results from last 7 days   Lab Units 12/13/22 2047   SODIUM mmol/L 137   POTASSIUM mmol/L 4.1   CHLORIDE mmol/L 103   CO2 mmol/L 23.5   BUN mg/dL 17   CREATININE mg/dL 1.23   CALCIUM mg/dL 9.1   BILIRUBIN mg/dL 0.8   ALK PHOS U/L 75   ALT (SGPT) U/L 19   AST (SGOT) U/L 24   GLUCOSE mg/dL 105*     Results from last 7 days   Lab Units 12/13/22 2047   TROPONIN T ng/mL <0.010     Results from last 7 days   Lab Units 12/13/22 2047   WBC 10*3/mm3 7.62   HEMOGLOBIN g/dL 16.2   HEMATOCRIT % 44.7   PLATELETS 10*3/mm3 253     Results from last 7 days   Lab Units 12/13/22 2047   INR  1.04   APTT seconds 26.8     Results from last 7 days   Lab Units 12/13/22 2047   MAGNESIUM mg/dL 2.4                         I personally viewed and interpreted the patient's EKG/Telemetry data.        Assessment and Plan:       Active Hospital Problems    Diagnosis  POA   • **Atrial fibrillation (HCC) [I48.91]  Yes   • SAH (subarachnoid hemorrhage) ([facility]) [I60.9]  Yes   • Traumatic subdural hemorrhage with loss of consciousness of 30 minutes or less (HCC) [S06.5X1A]  Yes   • Syncope, unspecified syncope type [R55]  Yes      Resolved Hospital Problems   No resolved problems to display.     1.  Fall, probable syncope-patient is not entirely sure if he blacked out he does not have any independent recollection.  2.  Traumatic subdural hematoma, neurosurgery following  3.  Hypertension- blood pressure target per primary team and neurosurgery  4.  Atrial fibrillation, persistent, with RVR- do not yet know the  duration of the episode of A. fib.  He easily could have been in atrial fibrillation with RVR which contributed to his initial event.  Heart rate is under much better control, I will switch his diltiazem to oral.  We will obtain an echocardiogram.  No anticoagulation at this point, patient cannot receive anticoagulation until deemed appropriate by neurosurgery.    Demar Elder III, MD  12/14/22  14:23 EST

## 2022-12-14 NOTE — ED TRIAGE NOTES
"Pt presents to ED after syncopal episode in the parking lot at [facility] after having dinner. Pt is unable to recall events of episode. Pt admits to \"having a few drinks\" with dinner. Pt presents to hematoma to right eyebrow.    Upon EMS arrival-pt was found to be in afib with RVR-denies cardiac history or chronic medical problems. Pt has not seen a PCP in 5 years.   "

## 2022-12-14 NOTE — PROGRESS NOTES
Discharge Planning Assessment  [facility]     Patient Name: Emmanuel Huizar  MRN: [id number]  Today's Date: 12/14/2022    Admit Date: 12/13/2022    Plan: Plan is home with no needs   Discharge Needs Assessment     Row Name 12/14/22 1408       Living Environment    People in Home spouse    Current Living Arrangements condominium    Potentially Unsafe Housing Conditions unable to assess    Primary Care Provided by self    Provides Primary Care For no one    Family Caregiver if Needed spouse    Quality of Family Relationships unable to assess    Able to Return to Prior Arrangements yes       Resource/Environmental Concerns    Resource/Environmental Concerns none    Transportation Concerns none       Transition Planning    Patient/Family Anticipates Transition to home with family    Patient/Family Anticipated Services at Transition none    Transportation Anticipated family or friend will provide       Discharge Needs Assessment    Equipment Currently Used at Home none    Concerns to be Addressed discharge planning    Anticipated Changes Related to Illness none    Equipment Needed After Discharge none    Provided Post Acute Provider List? N/A    Provided Post Acute Provider Quality & Resource List? N/A               Discharge Plan     Row Name 12/14/22 1409       Plan    Plan Plan is home with no needs    Plan Comments IMM noted. CCP spoke to patient at bedside.  CCP role explained.  Discharge planning discussed. Face sheet verified.   Pt emergency contact is his wife Ellen, [phone].  Pt obtains his medications from [facility] pharmacy on [address].  Pt is obtaining a new PCP . Pt lives in [facility] with his wife.  Pt is independent with ADL’s.  He uses no DME to ambulate.  He has no history of rehab.  He has no Home Health history.  Plan is home.  CCP following.              Continued Care and Services - Admitted Since 12/13/2022    Coordination has not been started for this encounter.           Demographic Summary    No documentation.                Functional Status    No documentation.                Psychosocial    No documentation.                Abuse/Neglect    No documentation.                Legal    No documentation.                Substance Abuse    No documentation.                Patient Forms    No documentation.                   Krista Dawkins RN

## 2022-12-14 NOTE — PLAN OF CARE
Goal Outcome Evaluation:           Progress: improving  Outcome Evaluation: Pt transfered to 5N. Belongings sent with pt. Up to wheelchair.

## 2022-12-14 NOTE — NURSING NOTE
RN attempted to call report. No CCU rn has been assigned yet.    Nursing report ED to floor  Emmanuel Huizar  67 y.o.  male    HPI :   Chief Complaint   Patient presents with   • Syncope       Admitting doctor:   Kevin Terrazas MD    Admitting diagnosis:   The primary encounter diagnosis was Syncope, unspecified syncope type. Diagnoses of New onset a-fib (HCC), Atrial fibrillation with RVR (HCC), Closed head injury, initial encounter, Hypertension, unspecified type, Contusion of face, initial encounter, SDH (subdural hematoma), and SAH (subarachnoid hemorrhage) (HCC) were also pertinent to this visit.    Code status:   Current Code Status     Date Active Code Status Order ID Comments User Context       Prior          Allergies:   Patient has no known allergies.    Isolation:   No active isolations    Intake and Output  No intake or output data in the 24 hours ending 12/14/22 0012    Weight:       12/13/22 2038   Weight: 90.7 kg (200 lb)       Most recent vitals:   Vitals:    12/13/22 2202 12/13/22 2206 12/13/22 2222 12/13/22 2238   BP:    140/99   BP Location:    Right arm   Patient Position:    Lying   Pulse: 100 115 108 99   Resp:    18   Temp:    97.3 °F (36.3 °C)   TempSrc:    Oral   SpO2: 95% 94%  97%   Weight:       Height:           Active LDAs/IV Access:   Lines, Drains & Airways     Active LDAs     Name Placement date Placement time Site Days    Peripheral IV 12/13/22 2102 Anterior;Distal;Left;Upper Arm 12/13/22 2102  Arm  less than 1                Labs (abnormal labs have a star):   Labs Reviewed   COMPREHENSIVE METABOLIC PANEL - Abnormal; Notable for the following components:       Result Value    Glucose 105 (*)     All other components within normal limits    Narrative:     GFR Normal >60  Chronic Kidney Disease <60  Kidney Failure <15     CBC WITH AUTO DIFFERENTIAL - Abnormal; Notable for the following components:    .9 (*)     MCH 36.6 (*)     MCHC 36.2 (*)     Immature Grans % 0.7 (*)     All  other components within normal limits   PROTIME-INR - Normal   APTT - Normal   TROPONIN (IN-HOUSE) - Normal    Narrative:     Troponin T Reference Range:  <= 0.03 ng/mL-   Negative for AMI  >0.03 ng/mL-     Abnormal for myocardial necrosis.  Clinicians would have to utilize clinical acumen, EKG, Troponin and serial changes to determine if it is an Acute Myocardial Infarction or myocardial injury due to an underlying chronic condition.       Results may be falsely decreased if patient taking Biotin.     T4, FREE - Normal    Narrative:     Results may be falsely increased if patient taking Biotin.     TSH - Normal   MAGNESIUM - Normal   CBC AND DIFFERENTIAL    Narrative:     The following orders were created for panel order CBC & Differential.  Procedure                               Abnormality         Status                     ---------                               -----------         ------                     CBC Auto Differential[[id number]]        Abnormal            Final result                 Please view results for these tests on the individual orders.       EKG:   ECG 12 Lead Tachycardia   Final Result   HEART RATE= 136  bpm   RR Interval= 441  ms   TN Interval=   ms   P Horizontal Axis=   deg   P Front Axis=   deg   QRSD Interval= 103  ms   QT Interval= 318  ms   QRS Axis= -2  deg   T Wave Axis= 51  deg   - ABNORMAL ECG -   Atrial fibrillation with rapid V-rate   Ventricular premature complex   Probable anterior infarct, old   No Prior Tracing for Comparison   Electronically Signed By: Clifton Arechiga ([facility]) 13-Dec-2022 22:23:53   Date and Time of Study: 2022-12-13 20:39:57          Meds given in ED:   Medications   niCARdipine (CARDENE) 25 mg in 250 mL NS infusion kit (has no administration in time range)   metoprolol tartrate (LOPRESSOR) injection 5 mg (has no administration in time range)   Tetanus-Diphth-Acell Pertussis (BOOSTRIX) injection 0.5 mL (0.5 mL Intramuscular Given 12/13/22 2103)   sodium chloride  0.9 % bolus 500 mL (500 mL Intravenous New Bag 12/13/22 2103)   metoprolol tartrate (LOPRESSOR) tablet 25 mg (25 mg Oral Given 12/13/22 2103)   magnesium sulfate 2g/50 mL (PREMIX) infusion (2 g Intravenous New Bag 12/13/22 2102)       Imaging results:  CT Head Without Contrast    Result Date: 12/13/2022  2 mm left frontal subdural hematoma, as well as a trace amount of subarachnoid hemorrhage within the left parietal lobe. There is no midline shift.  FINDINGS were called to Dr. Li at 10:35 PM.  Radiation dose reduction techniques were utilized, including automated exposure control and exposure modulation based on body size.  This report was finalized on 12/13/2022 10:37 PM by Dr. Nae Campo M.D.      XR Chest 1 View    Result Date: 12/13/2022  No focal pulmonary consolidation. Borderline heart size. Tortuous aorta. Follow-up as clinical indications persist.  This report was finalized on 12/13/2022 8:54 PM by Dr. Brandon Dotson M.D.        Ambulatory status:   - up assist x2    Social issues:   Social History     Socioeconomic History   • Marital status:        NIH Stroke Scale:         Brandon Fried RN  12/14/22 00:12 EST    +

## 2022-12-14 NOTE — PROGRESS NOTES
Shukri Steinberg MD                          [phone]            Patient ID:    Name:  Emmanuel Huizar    MRN:  [id number]    1955   67 y.o.  male            Patient Care Team:  Provider, No Known as PCP - General    CC/ Reason for visit: Syncope, traumatic head injury, new diagnosis - Afib/RVR    Subjective: Pt seen and examined this AM. No acute overnight events noted. Doing better.  States that he is feeling better.  Does have some pain at the left orbit, frontal area.  He states that the ice pack is working.  Denies any visual issues and limitations.    States that he was with his wife and just had dinner and a couple drinks-vodka martini and states that he was doing fairly well but got off the car and does not remember anything after that other than seeing himself in the ambulance.  Wife is not at bedside.  He is concerned about his A. fib particularly and states that he has 2 brothers 1 of whom is a physician also has A. fib and needed ablation.  Feels like that is the etiology for his symptoms but denies any palpitations or using any medications for it previously.    ROS: Denies any subjective fevers, syncope or presyncopal events, new neurological deficits, nausea or vomiting currently    Objective     Vital Signs past 24hrs    BP range: BP: (127-175)/() 146/96  Pulse range: Heart Rate:  [] 89  Resp rate range: Resp:  [14-20] 16  Temp range: Temp (24hrs), Av.8 °F (36.6 °C), Min:97.3 °F (36.3 °C), Max:98.5 °F (36.9 °C)      Ventilator/Non-Invasive Ventilation Settings (From admission, onward)    None          Vent Settings                                         Device (Oxygen Therapy): room air       82.3 kg (181 lb 7 oz); Body mass index is 22.68 kg/m².      Intake/Output Summary (Last 24 hours) at 2022 1350  Last data filed at 2022 1203  Gross per 24 hour   Intake 65 ml   Output 2175 ml   Net -2110 ml       PHYSICAL EXAM    Constitutional: Middle-aged pt in bed, no acute respiratory distress, no accessory muscle use  Head: - NCAT -left periorbital and frontal hematoma with local TTP  Eyes: No pallor.  Anicteric sclerae, EOMI.  ENMT:  Mallampati 3, no oral thrush. Moist MM.   NECK: Trachea midline, No thyromegaly, no palpable cervical lymphadenopathy  Heart: Irreg RR, no murmur. No pedal edema   Lungs: MORAIMA +, No wheezes/ crackles heard    Abdomen: Soft. No tenderness, guarding or rigidity. No palpable masses  Extremities: Extremities warm and well perfused. No cyanosis/ clubbing  Neuro: Conscious, answers appropriately, no gross focal neuro deficits  Psych: Mood and affect appropriate    PPE recommended per [facility] infectious disease Isolation protocol for the current clinical scenario (as mentioned below) was followed.     Scheduled meds:  sodium chloride, 10 mL, Intravenous, Q12H        IV meds:                      dilTIAZem, 5-15 mg/hr, Last Rate: 5 mg/hr (12/14/22 1203)  niCARdipine, 5-15 mg/hr, Last Rate: Stopped (12/14/22 0325)        Data Review:      Results from last 7 days   Lab Units 12/13/22  2047   SODIUM mmol/L 137   POTASSIUM mmol/L 4.1   CHLORIDE mmol/L 103   CO2 mmol/L 23.5   BUN mg/dL 17   CREATININE mg/dL 1.23   CALCIUM mg/dL 9.1   BILIRUBIN mg/dL 0.8   ALK PHOS U/L 75   ALT (SGPT) U/L 19   AST (SGOT) U/L 24   GLUCOSE mg/dL 105*   WBC 10*3/mm3 7.62   HEMOGLOBIN g/dL 16.2   PLATELETS 10*3/mm3 253   INR  1.04       Lab Results   Component Value Date    CALCIUM 9.1 12/13/2022                    I have personally reviewed the results from the time of this admission to 12/14/2022 13:50 EST and agree with these findings:  [x]  Laboratory  [x]  Microbiology  [x]  Radiology  []  EKG/Telemetry   [x]  Cardiology/Vascular   []  Pathology  []  Old records    ASSESSMENT   Syncope - ?  Etiology  Small subdural/subarachnoid hemorrhage -traumatic  Left periorbital hematoma  A. fib with RVR  Alcohol use    PLAN:  All  problems are new to me.  Work-up done so far as well as recommendations from prior intensivist noted.  Patient is neurologically intact overnight and repeat CT of the head is stable.  Await neurosurgery input regarding future course of action.  Does have left periorbital hematoma which seems to be better.  Ophthalmology has been consulted and await input.  No obvious visual field defects are muscle entrapment during my physical exam.  A. fib RVR noted and will use as needed medications for now and await cardiology input regarding need for further work-up and plan.  Syncope of unclear etiology-he thinks it is related to his new diagnosed A. fib but he does not have any palpitations previously and he is not on any rate control medications.  I would look for alternate etiology-could be potentially related to alcohol use but he states that he has not drank more than usual and might need to get more information from his wife who was with him.  Guarded prognosis  Full code    Mechanical DVT prophylaxis    Patient will be transferred to the floor and we will consult hospitalist service to take over the patient's care as primary.  We will be available for any ongoing pulmonary as well as new critical care issues. Appreciate their assistance.    Shukri Steinberg MD  12/14/2022

## 2022-12-14 NOTE — ED PROVIDER NOTES
EMERGENCY DEPARTMENT ENCOUNTER  I wore full protective equipment throughout this patient encounter including a N95 mask, eye shield, gown and gloves. Hand hygiene was performed before donning protective equipment and after removal when leaving the room.    Room Number:  112/1  Date of encounter:  12/13/2022  PCP: Provider, No Known    HPI:  Context: Emmanuel Huizar is a 67 y.o. male who presents to the ED c/o chief complaint of syncope and closed head injury.  History supplied by patient and by EMS.  Patient reports that he ate dinner at [employer].  Patient reports he was walking on the parking lot when he lost consciousness.  Patient denies any prodrome, is amnestic to the event.  Patient did strike his head, has contusion just above his left eye.  Patient complains of mild dull pain where he struck his head, denies any other headache, no confusion, no visual disturbances, no difficulty with eye movements, no facial anesthesia.  Patient denies any neck or back pain, no radicular pain, no weakness or numbness.  EMS reports that when they arrived patient was in A. fib RVR.  Patient denies any cardiac history, denies any chronic medical problems, has not been seen by physician in 5 years.  Patient denies any heart racing sensation, denies any palpitations, no chest pain or shortness of breath.  Patient denies any of the symptoms recently.  Patient reports he has been at baseline health, denies any recent vomiting or diarrhea, no fever or systemic symptoms.  Patient does report that he believes that he has bronchitis, has had a cough for 2 weeks but cough is nonproductive and cough is minimal and improving.  Patient denies any fever or systemic symptoms.  Tetanus not up-to-date.  Patient does admit to drinking alcohol tonight.    MEDICAL HISTORY REVIEW  Reviewed in EPIC    PAST MEDICAL HISTORY  Active Ambulatory Problems     Diagnosis Date Noted   • No Active Ambulatory Problems     Resolved Ambulatory  Problems     Diagnosis Date Noted   • No Resolved Ambulatory Problems     No Additional Past Medical History       PAST SURGICAL HISTORY  No past surgical history on file.    FAMILY HISTORY  No family history on file.    SOCIAL HISTORY  Social History     Socioeconomic History   • Marital status:        ALLERGIES  Patient has no known allergies.    The patient's allergies have been reviewed    REVIEW OF SYSTEMS  All systems reviewed and negative except for those discussed in HPI.     PHYSICAL EXAM  I have reviewed the triage vital signs and nursing notes.  ED Triage Vitals   Temp Pulse Resp BP SpO2   -- -- -- -- --      Temp src Heart Rate Source Patient Position BP Location FiO2 (%)   -- -- -- -- --       General: No acute distress.  HENT: Contusion at lateral aspect of left eyebrow, small associated abrasion, no crepitus or deformity, no raccoon eyes or coley sign, no facial anesthesia, visual acuity grossly normal, extraocular motion intact, PERRL, Nares patent.  Eyes: no scleral icterus.  Neck: trachea midline, no ROM limitations.Cervical spine: No step-offs or deformities, no midline tenderness, full range of motion.  CV: Tachycardic, irregulary irregular  Respiratory: normal effort, CTAB.  Abdomen: soft, nondistended, NTTP, no rebound tenderness, no guarding or rigidity.  Musculoskeletal: no deformity.  Neuro: alert, moves all extremities, follows commands.  Skin: warm, dry.    LAB RESULTS  Recent Results (from the past 24 hour(s))   ECG 12 Lead Tachycardia    Collection Time: 12/13/22  8:39 PM   Result Value Ref Range    QT Interval 318 ms   Protime-INR    Collection Time: 12/13/22  8:47 PM    Specimen: Blood   Result Value Ref Range    Protime 13.7 11.7 - 14.2 Seconds    INR 1.04 0.90 - 1.10   aPTT    Collection Time: 12/13/22  8:47 PM    Specimen: Blood   Result Value Ref Range    PTT 26.8 22.7 - 35.4 seconds   Comprehensive Metabolic Panel    Collection Time: 12/13/22  8:47 PM    Specimen: Blood    Result Value Ref Range    Glucose 105 (H) 65 - 99 mg/dL    BUN 17 8 - 23 mg/dL    Creatinine 1.23 0.76 - 1.27 mg/dL    Sodium 137 136 - 145 mmol/L    Potassium 4.1 3.5 - 5.2 mmol/L    Chloride 103 98 - 107 mmol/L    CO2 23.5 22.0 - 29.0 mmol/L    Calcium 9.1 8.6 - 10.5 mg/dL    Total Protein 8.3 6.0 - 8.5 g/dL    Albumin 4.70 3.50 - 5.20 g/dL    ALT (SGPT) 19 1 - 41 U/L    AST (SGOT) 24 1 - 40 U/L    Alkaline Phosphatase 75 39 - 117 U/L    Total Bilirubin 0.8 0.0 - 1.2 mg/dL    Globulin 3.6 gm/dL    A/G Ratio 1.3 g/dL    BUN/Creatinine Ratio 13.8 7.0 - 25.0    Anion Gap 10.5 5.0 - 15.0 mmol/L    eGFR 64.7 >60.0 mL/min/1.73   Troponin    Collection Time: 12/13/22  8:47 PM    Specimen: Blood   Result Value Ref Range    Troponin T <0.010 0.000 - 0.030 ng/mL   T4, Free    Collection Time: 12/13/22  8:47 PM    Specimen: Blood   Result Value Ref Range    Free T4 1.32 0.93 - 1.70 ng/dL   TSH    Collection Time: 12/13/22  8:47 PM    Specimen: Blood   Result Value Ref Range    TSH 3.690 0.270 - 4.200 uIU/mL   Magnesium    Collection Time: 12/13/22  8:47 PM    Specimen: Blood   Result Value Ref Range    Magnesium 2.4 1.6 - 2.4 mg/dL   CBC Auto Differential    Collection Time: 12/13/22  8:47 PM    Specimen: Blood   Result Value Ref Range    WBC 7.62 3.40 - 10.80 10*3/mm3    RBC 4.43 4.14 - 5.80 10*6/mm3    Hemoglobin 16.2 13.0 - 17.7 g/dL    Hematocrit 44.7 37.5 - 51.0 %    .9 (H) 79.0 - 97.0 fL    MCH 36.6 (H) 26.6 - 33.0 pg    MCHC 36.2 (H) 31.5 - 35.7 g/dL    RDW 13.1 12.3 - 15.4 %    RDW-SD 49.4 37.0 - 54.0 fl    MPV 10.7 6.0 - 12.0 fL    Platelets 253 140 - 450 10*3/mm3    Neutrophil % 52.1 42.7 - 76.0 %    Lymphocyte % 31.1 19.6 - 45.3 %    Monocyte % 11.8 5.0 - 12.0 %    Eosinophil % 3.1 0.3 - 6.2 %    Basophil % 1.2 0.0 - 1.5 %    Immature Grans % 0.7 (H) 0.0 - 0.5 %    Neutrophils, Absolute 3.97 1.70 - 7.00 10*3/mm3    Lymphocytes, Absolute 2.37 0.70 - 3.10 10*3/mm3    Monocytes, Absolute 0.90 0.10 - 0.90  10*3/mm3    Eosinophils, Absolute 0.24 0.00 - 0.40 10*3/mm3    Basophils, Absolute 0.09 0.00 - 0.20 10*3/mm3    Immature Grans, Absolute 0.05 0.00 - 0.05 10*3/mm3    nRBC 0.0 0.0 - 0.2 /100 WBC       I ordered the above labs and reviewed the results.    RADIOLOGY  CT Head Without Contrast    Result Date: 12/13/2022  CT HEAD WITHOUT CONTRAST  HISTORY: Fall  COMPARISON: None available.  TECHNIQUE: Axial CT imaging was obtained through the brain. No IV contrast was administered.  FINDINGS: There is trace mildly hyperdense material overlying the left frontal lobe. Appearance is concerning for a small subdural hematoma. In addition, the patient is noted to have a small amount of subarachnoid hemorrhage within the left parietal lobe. This is best seen on images 33-35 of the axial series. There is mild atrophy. There is no midline shift or mass effect. No obvious calvarial fracture is seen. There is left periorbital soft tissue swelling. Left globe appears intact. No definite post septal extension is seen. Paranasal sinuses and mastoid air cells appear clear.      2 mm left frontal subdural hematoma, as well as a trace amount of subarachnoid hemorrhage within the left parietal lobe. There is no midline shift.  FINDINGS were called to Dr. Li at 10:35 PM.  Radiation dose reduction techniques were utilized, including automated exposure control and exposure modulation based on body size.  This report was finalized on 12/13/2022 10:37 PM by Dr. Nae Campo M.D.      XR Chest 1 View    Result Date: 12/13/2022  XR CHEST 1 VW-  HISTORY: Male who is 67 years-old,  palpitations  TECHNIQUE: Frontal views of the chest  COMPARISON: None available  FINDINGS: The heart size is borderline. Aorta is tortuous. Pulmonary vasculature is unremarkable. No focal pulmonary consolidation, pleural effusion, or pneumothorax. Gas-filled bowel is apparent under the left hemidiaphragm, only partly included, correlate clinically. No acute  osseous process.      No focal pulmonary consolidation. Borderline heart size. Tortuous aorta. Follow-up as clinical indications persist.  This report was finalized on 12/13/2022 8:54 PM by Dr. Brandon Dotson M.D.        I ordered the above noted radiological studies. I reviewed the images and results. I agree with the radiologist interpretation.    PROCEDURES  Procedures    MEDICATIONS GIVEN IN ER  Medications   metoprolol tartrate (LOPRESSOR) injection 5 mg (5 mg Intravenous Given 12/13/22 2113)   niCARdipine (CARDENE) 25 mg in 250 mL NS infusion kit (has no administration in time range)   Tetanus-Diphth-Acell Pertussis (BOOSTRIX) injection 0.5 mL (0.5 mL Intramuscular Given 12/13/22 2103)   sodium chloride 0.9 % bolus 500 mL (500 mL Intravenous New Bag 12/13/22 2103)   metoprolol tartrate (LOPRESSOR) tablet 25 mg (25 mg Oral Given 12/13/22 2103)   magnesium sulfate 2g/50 mL (PREMIX) infusion (2 g Intravenous New Bag 12/13/22 2102)       PROGRESS, DATA ANALYSIS, CONSULTS, AND MEDICAL DECISION MAKING  A complete history and physical exam have been performed.  All available laboratory and imaging results have been reviewed by myself prior to disposition.    MDM  After the initial H&P, I discussed pertinent information from history and physical exam with patient/family.  Discussed differential diagnosis.  Discussed plan for ED evaluation/workup/treatment.  All questions answered.  Patient/family is agreeable with plan.  ED Course as of 12/13/22 2310   Tue Dec 13, 2022   2043 EKG independently viewed and contemporaneously interpreted by ED physician. Time: 2039.  Rate 136.  Interpretation: Atrial fibrillation with RVR, normal axis, delayed R wave progression, no acute ST changes, baseline artifact present. [JG]   2044 Patient presents with fall with closed head injury, given patient's age, high risk of morbidity and mortality, obtaining CT head imaging.  Patient presents with new onset A. fib, A. fib RVR.  Obtain  new onset A. fib work-up including thyroid studies.  Patient currently in RVR, high risk of morbidity or mortality, will use rate controlling medication, will use IV drips if needed. [JG]   2130 Patient is status post single dose of IV metoprolol, heart rate improved, currently running between 105 and 115.  Giving oral metoprolol.  Will observe, patient has any further periods of RVR, will give additional IV metoprolol. [JG]   2130 CT imaging came to take patient.  Patient was refusing.  I spoke with patient, discussed with him that given his head injury with retrograde amnesia as well as his age, significant risk of intracranial hemorrhage, did recommend to proceed with imaging.  After extensive discussion risks and benefits, patient is now agreeable for imaging. [JG]   2240 Phone call with radiologist.  I had previously reviewed the images. I discussed the patient, imaging, and their interpretation.  CT head imaging is significant for traces of subdural hemorrhage as well as subarachnoid hemorrhage.  See dictated report for final interpretation.     [JG]   2240 Consulting neurosurgery, consulting pulmonology for admission to ICU. [JG]   2245 Patient reassessed.  Discussed ED findings, differential diagnosis, and the need for admission for evaluation/treatment.  They are agreeable to admission and all questions were answered.     [JG]   2252 Blood pressure currently elevated at 150/112.  Started on nicardipine drip. [JG]   2308 Phone call with Dr Terrazas.  Discussed the patient, relevant history, exam, diagnostics, ED findings/progress, and concerns. They agree to admit the patient to ICU. Care assumed by the admitting physician at this time.     [JG]      ED Course User Index  [JG] Tyler Li MD       AS OF 23:10 EST VITALS:    BP - 140/99  HR - 99  TEMP - 97.3 °F (36.3 °C) (Oral)  O2 SATS - 97%    DIAGNOSIS  Final diagnoses:   Syncope, unspecified syncope type   New onset a-fib (HCC)   Atrial fibrillation  with RVR (HCC)   Closed head injury, initial encounter   Hypertension, unspecified type   Contusion of face, initial encounter   SDH (subdural hematoma)   SAH (subarachnoid hemorrhage) (HCC)         DISPOSITION  ADMISSION    Discussed treatment plan and reason for admission with pt/family and admitting physician.  Pt/family voiced understanding of the plan for admission for further testing/treatment as needed.          Tyler Li MD  12/13/22 [id number]       Tyler Li MD  12/13/22 2137

## 2022-12-14 NOTE — CONSULTS
CONSULT NOTE    INTERNAL MEDICINE   [facility]       Referring Provider: Shukri Steinberg,*  Reason for Consultation: To evaluate chronic medical conditions that may be exacerbated postoperatively  PCP: Provider, No Known      HPI  Patient is a 67 y.o. male presented to  after he was at dinner and was in the parking lost and passed out and fell and hit his head.  He had had alcohol that night.  He was found to have a subarachnoid hemorrhage and a subdural hemorrhage with a large left periorbital hematoma.  He was positive on orthostatics and he also had A. fib with rapid ventricular rate and was admitted to the ICU.    Patient is now the ICU and heart rate still 110 and blood pressure 152/108.  He was not on any medications prior.  Of note 2 brothers had atrial fibrillation.  Patient does not feel when he goes in or out of A. fib he just says that he has had a lack of stamina over the past couple years-however he also said that he has had COVID twice and he was a long-haul her in 2020.      PAST MEDICAL HISTORY  No past medical history on file.    PAST SURGICAL HISTORY  No past surgical history on file.    FAMILY HISTORY  Brothers have afib    SOCIAL HISTORY  Social History     Tobacco Use   • Smoking status: Never     Passive exposure: Never   • Smokeless tobacco: Never   Vaping Use   • Vaping Use: Never used   Substance Use Topics   • Alcohol use: Yes     Alcohol/week: 3.0 standard drinks     Types: 3 Shots of liquor per week   • Drug use: Never       MEDICATIONS:  No medications prior to admission.       Allergies:  Patient has no known allergies.    Review of Systems:  Insomnia, decreased stamina  Negative for following (except as per HPI):  Constitution: chills, fevers,   Eyes: change of vision, loss of vision and discharge  ENT: ear drainage, ear ringing and facial trauma  Respiratory: cough, pleuritic pain, shortness of air  Cardiovascular: chest pressure, pain, lower extremity  "edema, palpitations  Gastrointestinal: constipation, diarrhea, nausea, vomiting, pain    Integument: rash and wound  Hematologic / Lymphatic: excessive bleeding and easy bruising  Musculoskeletal: joint pain, joint stiffness, joint swelling and muscle pain  Neurological: headaches, numbness, seizures and tremors  Behavioral / Psych: anxiety, depression and hallucinations         Vital Signs  Temp:  [97.3 °F (36.3 °C)-98.6 °F (37 °C)] 98.6 °F (37 °C)  Heart Rate:  [] 100  Resp:  [14-20] 16  BP: (118-175)/() 137/110  Flowsheet Rows    Flowsheet Row First Filed Value   Admission Height 190.5 cm (75\") Documented at 12/13/2022 2038   Admission Weight 90.7 kg (200 lb) Documented at 12/13/2022 2038           Physical Exam:  General Appearance:    Alert, cooperative, in no acute distress   Head:    Normocephalic, without obvious abnormality,    Eyes:         conjunctivae and sclerae normal, no icterus, PERRLA; large periorbital hematoma left eye, left eye lid swollen   ENT:    Ears grossly intact, oral mucosa moist,   Neck:   No adenopathy, supple, trachea midline,    Back:     Normal to inspection, range of motion normal   Lungs:     Clear to auscultation,respirations regular, even and                   unlabored    Heart:    irreg irreg and tachycardic,  no murmur, normal S1 and S2,   Abdomen:     Normal bowel sounds, no masses,  soft non-tender, non-distended,    Extremities:   Moves all extremities well, no cyanosis, no edema,             Pulses:   Pulses palpable and equal bilaterally   Skin:   No bleeding, +rash over manubrium,  + bruising    Neurologic:    Psych:   Cranial nerves 2 - 12 grossly intact, sensation intact,     Moves all extremities well, equal bilateral strength    Alert and Oriented x 3, Normal Affect   I used full protective equipment while examining this patient.  This includes face mask /protective eyewear and protective gown when appropriate.  I washed my hands before entering the room " and immediately upon leaving the room    LABS:  Admission on 12/13/2022   Component Date Value Ref Range Status   • QT Interval 12/13/2022 318  ms Final   • Protime 12/13/2022 13.7  11.7 - 14.2 Seconds Final   • INR 12/13/2022 1.04  0.90 - 1.10 Final   • PTT 12/13/2022 26.8  22.7 - 35.4 seconds Final   • Glucose 12/13/2022 105 (H)  65 - 99 mg/dL Final   • BUN 12/13/2022 17  8 - 23 mg/dL Final   • Creatinine 12/13/2022 1.23  0.76 - 1.27 mg/dL Final   • Sodium 12/13/2022 137  136 - 145 mmol/L Final   • Potassium 12/13/2022 4.1  3.5 - 5.2 mmol/L Final   • Chloride 12/13/2022 103  98 - 107 mmol/L Final   • CO2 12/13/2022 23.5  22.0 - 29.0 mmol/L Final   • Calcium 12/13/2022 9.1  8.6 - 10.5 mg/dL Final   • Total Protein 12/13/2022 8.3  6.0 - 8.5 g/dL Final   • Albumin 12/13/2022 4.70  3.50 - 5.20 g/dL Final   • ALT (SGPT) 12/13/2022 19  1 - 41 U/L Final   • AST (SGOT) 12/13/2022 24  1 - 40 U/L Final   • Alkaline Phosphatase 12/13/2022 75  39 - 117 U/L Final   • Total Bilirubin 12/13/2022 0.8  0.0 - 1.2 mg/dL Final   • Globulin 12/13/2022 3.6  gm/dL Final   • A/G Ratio 12/13/2022 1.3  g/dL Final   • BUN/Creatinine Ratio 12/13/2022 13.8  7.0 - 25.0 Final   • Anion Gap 12/13/2022 10.5  5.0 - 15.0 mmol/L Final   • eGFR 12/13/2022 64.7  >60.0 mL/min/1.73 Final    National Kidney Foundation and American Society of Nephrology (ASN) Task Force recommended calculation based on the Chronic Kidney Disease Epidemiology Collaboration (CKD-EPI) equation refit without adjustment for race.   • Troponin T 12/13/2022 <0.010  0.000 - 0.030 ng/mL Final   • Free T4 12/13/2022 1.32  0.93 - 1.70 ng/dL Final   • TSH 12/13/2022 3.690  0.270 - 4.200 uIU/mL Final   • Magnesium 12/13/2022 2.4  1.6 - 2.4 mg/dL Final   • WBC 12/13/2022 7.62  3.40 - 10.80 10*3/mm3 Final   • RBC 12/13/2022 4.43  4.14 - 5.80 10*6/mm3 Final   • Hemoglobin 12/13/2022 16.2  13.0 - 17.7 g/dL Final   • Hematocrit 12/13/2022 44.7  37.5 - 51.0 % Final   • MCV 12/13/2022 100.9  (H)  79.0 - 97.0 fL Final   • MCH 12/13/2022 36.6 (H)  26.6 - 33.0 pg Final   • MCHC 12/13/2022 36.2 (H)  31.5 - 35.7 g/dL Final   • RDW 12/13/2022 13.1  12.3 - 15.4 % Final   • RDW-SD 12/13/2022 49.4  37.0 - 54.0 fl Final   • MPV 12/13/2022 10.7  6.0 - 12.0 fL Final   • Platelets 12/13/2022 253  140 - 450 10*3/mm3 Final   • Neutrophil % 12/13/2022 52.1  42.7 - 76.0 % Final   • Lymphocyte % 12/13/2022 31.1  19.6 - 45.3 % Final   • Monocyte % 12/13/2022 11.8  5.0 - 12.0 % Final   • Eosinophil % 12/13/2022 3.1  0.3 - 6.2 % Final   • Basophil % 12/13/2022 1.2  0.0 - 1.5 % Final   • Immature Grans % 12/13/2022 0.7 (H)  0.0 - 0.5 % Final   • Neutrophils, Absolute 12/13/2022 3.97  1.70 - 7.00 10*3/mm3 Final   • Lymphocytes, Absolute 12/13/2022 2.37  0.70 - 3.10 10*3/mm3 Final   • Monocytes, Absolute 12/13/2022 0.90  0.10 - 0.90 10*3/mm3 Final   • Eosinophils, Absolute 12/13/2022 0.24  0.00 - 0.40 10*3/mm3 Final   • Basophils, Absolute 12/13/2022 0.09  0.00 - 0.20 10*3/mm3 Final   • Immature Grans, Absolute 12/13/2022 0.05  0.00 - 0.05 10*3/mm3 Final   • nRBC 12/13/2022 0.0  0.0 - 0.2 /100 WBC Final   • Glucose 12/14/2022 96  70 - 130 mg/dL Final       DIAGNOSTICS:  CT Head Without Contrast    Result Date: 12/14/2022  No significant interval change in previously described left frontoparietal subdural hematoma and trace parietal subarachnoid hemorrhage.  Radiation dose reduction techniques were utilized, including automated exposure control and exposure modulation based on body size.  This report was finalized on 12/14/2022 5:31 AM by Dr. Nae Campo M.D.      CT Head Without Contrast    Result Date: 12/13/2022  2 mm left frontal subdural hematoma, as well as a trace amount of subarachnoid hemorrhage within the left parietal lobe. There is no midline shift.  FINDINGS were called to Dr. Li at 10:35 PM.  Radiation dose reduction techniques were utilized, including automated exposure control and exposure modulation  based on body size.  This report was finalized on 12/13/2022 10:37 PM by Dr. Nae Campo M.D.      XR Chest 1 View    Result Date: 12/13/2022  No focal pulmonary consolidation. Borderline heart size. Tortuous aorta. Follow-up as clinical indications persist.  This report was finalized on 12/13/2022 8:54 PM by Dr. Brandon Dotson M.D.      Echo  Left ventricular systolic function is normal. Left ventricular ejection fraction appears to be 56 - 60%. Normal left ventricular cavity size and wall thickness noted. All left ventricular wall segments contract normally. Left ventricular diastolic function was indeterminate.  •  Both atria are moderately dilated.  •  Mild to moderate tricuspid valve regurgitation is present. Estimated right ventricular systolic pressure from tricuspid regurgitation is mildly elevated (35-45 mmHg). Calculated right ventricular systolic pressure from tricuspid regurgitation is 40 mmHg.       Results Review:   I reviewed the patient's new clinical results.  I personally viewed and interpreted the patient's EKG/Telemetry data  Old records reviewed    ASSESSMENT AND PLAN    Atrial fibrillation (HCC)    Syncope, unspecified syncope type    SAH (subarachnoid hemorrhage) (HCC)    Traumatic subdural hemorrhage with loss of consciousness of 30 minutes or less (HCC)    Periorbital hematoma of left eye    Closed head injury        ·   Syncope, unspecified syncope type  -possibly 2/2 arrthymia  -check orthostatics again- they were initially abnormal  -Morning labs ordered  -PT and OT evaluation and treat ordered    ·  SAH (subarachnoid hemorrhage)  /  Traumatic subdural hemorrhage with loss of consciousness of 30 minutes or less / Closed head injury  -repeated CT is stable  -Follow-up with neurosurg in clinic in 10-14 days with repeat head CT  -No anticoag/antiplatelets until follow-up head CT complete  -Continue close head injury precautions  -ordered seizure prec    · Atrial fibrillation still  with some tachycardia  -off cardizem drip. Now and out of ICU  -or PO short acting cardizem- titrate up and d/c on long acting    · Macrocytosis- wonder if the amount of alcohol reported was accurate.  We will check B12 and folate in the morning    · Patient has mildly elevated RVSP and mild to moderate TR-May need to consider getting an outpatient sleep study we will get overnight oximetry    ·  Periorbital hematoma of left eye  -continue ice  -optho eval'd     +DVT proph: scd  +Full code    I discussed the patients findings and my recommendations with the patient and/or family.  Please reference all orders placed.    Mesha Griffin MD  12/14/22  18:35 EST      This document is intended for medical expert use only. Reading of this document by patients and/or patient's family without participating in medical staff guidance may result in misinterpretation and unintended morbidity. Any interpretation of such data is the responsibility of the patient and/or family member responsible for the patient in concert with their primary or specialist providers, and NOT to be left for sources of online searches such as [employer], [employer] or similar queries. Relying on these approaches to knowledge may result in misinterpretation, misguided goals of care and even death should patients or family members try recommendations outside of the realm of professional medical care in a supervised way.    Dictated utilizing Dragon dictation:  Much of this encounter note is an electronic transcription/translation of spoken language to printed text. The electronic translation of spoken language may permit erroneous, or at times, nonsensical words or phrases to be inadvertently transcribed; Although I have reviewed the note for such errors, some may still exist.

## 2022-12-15 ENCOUNTER — READMISSION MANAGEMENT (OUTPATIENT)
Dept: CALL CENTER | Facility: HOSPITAL | Age: 67
End: 2022-12-15

## 2022-12-15 ENCOUNTER — TELEPHONE (OUTPATIENT)
Dept: NEUROSURGERY | Facility: CLINIC | Age: 67
End: 2022-12-15

## 2022-12-15 VITALS
HEIGHT: 75 IN | WEIGHT: 177.25 LBS | SYSTOLIC BLOOD PRESSURE: 113 MMHG | HEART RATE: 83 BPM | TEMPERATURE: 97.5 F | DIASTOLIC BLOOD PRESSURE: 78 MMHG | RESPIRATION RATE: 20 BRPM | BODY MASS INDEX: 22.04 KG/M2 | OXYGEN SATURATION: 95 %

## 2022-12-15 DIAGNOSIS — I60.9 SAH (SUBARACHNOID HEMORRHAGE): Primary | ICD-10-CM

## 2022-12-15 DIAGNOSIS — S06.5X1A TRAUMATIC SUBDURAL HEMORRHAGE WITH LOSS OF CONSCIOUSNESS OF 30 MINUTES OR LESS, INITIAL ENCOUNTER: ICD-10-CM

## 2022-12-15 LAB
ALBUMIN SERPL-MCNC: 4.2 G/DL (ref 3.5–5.2)
ALBUMIN/GLOB SERPL: 1.8 G/DL
ALP SERPL-CCNC: 60 U/L (ref 39–117)
ALT SERPL W P-5'-P-CCNC: 14 U/L (ref 1–41)
ANION GAP SERPL CALCULATED.3IONS-SCNC: 11.4 MMOL/L (ref 5–15)
AST SERPL-CCNC: 17 U/L (ref 1–40)
BASOPHILS # BLD AUTO: 0.07 10*3/MM3 (ref 0–0.2)
BASOPHILS NFR BLD AUTO: 0.8 % (ref 0–1.5)
BILIRUB SERPL-MCNC: 1.1 MG/DL (ref 0–1.2)
BUN SERPL-MCNC: 15 MG/DL (ref 8–23)
BUN/CREAT SERPL: 18.1 (ref 7–25)
CALCIUM SPEC-SCNC: 8.4 MG/DL (ref 8.6–10.5)
CHLORIDE SERPL-SCNC: 107 MMOL/L (ref 98–107)
CO2 SERPL-SCNC: 21.6 MMOL/L (ref 22–29)
CREAT SERPL-MCNC: 0.83 MG/DL (ref 0.76–1.27)
DEPRECATED RDW RBC AUTO: 49.6 FL (ref 37–54)
EGFRCR SERPLBLD CKD-EPI 2021: 96.5 ML/MIN/1.73
EOSINOPHIL # BLD AUTO: 0.28 10*3/MM3 (ref 0–0.4)
EOSINOPHIL NFR BLD AUTO: 3.1 % (ref 0.3–6.2)
ERYTHROCYTE [DISTWIDTH] IN BLOOD BY AUTOMATED COUNT: 13.1 % (ref 12.3–15.4)
GLOBULIN UR ELPH-MCNC: 2.4 GM/DL
GLUCOSE BLDC GLUCOMTR-MCNC: 115 MG/DL (ref 70–130)
GLUCOSE BLDC GLUCOMTR-MCNC: 88 MG/DL (ref 70–130)
GLUCOSE SERPL-MCNC: 91 MG/DL (ref 65–99)
HCT VFR BLD AUTO: 38.2 % (ref 37.5–51)
HGB BLD-MCNC: 13.4 G/DL (ref 13–17.7)
IMM GRANULOCYTES # BLD AUTO: 0.03 10*3/MM3 (ref 0–0.05)
IMM GRANULOCYTES NFR BLD AUTO: 0.3 % (ref 0–0.5)
LYMPHOCYTES # BLD AUTO: 1.44 10*3/MM3 (ref 0.7–3.1)
LYMPHOCYTES NFR BLD AUTO: 16 % (ref 19.6–45.3)
MCH RBC QN AUTO: 36.2 PG (ref 26.6–33)
MCHC RBC AUTO-ENTMCNC: 35.1 G/DL (ref 31.5–35.7)
MCV RBC AUTO: 103.2 FL (ref 79–97)
MONOCYTES # BLD AUTO: 0.91 10*3/MM3 (ref 0.1–0.9)
MONOCYTES NFR BLD AUTO: 10.1 % (ref 5–12)
NEUTROPHILS NFR BLD AUTO: 6.29 10*3/MM3 (ref 1.7–7)
NEUTROPHILS NFR BLD AUTO: 69.7 % (ref 42.7–76)
NRBC BLD AUTO-RTO: 0 /100 WBC (ref 0–0.2)
PLATELET # BLD AUTO: 215 10*3/MM3 (ref 140–450)
PMV BLD AUTO: 10.9 FL (ref 6–12)
POTASSIUM SERPL-SCNC: 3.6 MMOL/L (ref 3.5–5.2)
PROT SERPL-MCNC: 6.6 G/DL (ref 6–8.5)
RBC # BLD AUTO: 3.7 10*6/MM3 (ref 4.14–5.8)
SODIUM SERPL-SCNC: 140 MMOL/L (ref 136–145)
VIT B12 BLD-MCNC: 434 PG/ML (ref 211–946)
WBC NRBC COR # BLD: 9.02 10*3/MM3 (ref 3.4–10.8)

## 2022-12-15 PROCEDURE — 82747 ASSAY OF FOLIC ACID RBC: CPT | Performed by: INTERNAL MEDICINE

## 2022-12-15 PROCEDURE — 82962 GLUCOSE BLOOD TEST: CPT

## 2022-12-15 PROCEDURE — 82607 VITAMIN B-12: CPT | Performed by: INTERNAL MEDICINE

## 2022-12-15 PROCEDURE — 80053 COMPREHEN METABOLIC PANEL: CPT | Performed by: INTERNAL MEDICINE

## 2022-12-15 PROCEDURE — 99232 SBSQ HOSP IP/OBS MODERATE 35: CPT | Performed by: INTERNAL MEDICINE

## 2022-12-15 PROCEDURE — 85014 HEMATOCRIT: CPT | Performed by: INTERNAL MEDICINE

## 2022-12-15 PROCEDURE — 85025 COMPLETE CBC W/AUTO DIFF WBC: CPT | Performed by: INTERNAL MEDICINE

## 2022-12-15 RX ORDER — LOSARTAN POTASSIUM 25 MG/1
25 TABLET ORAL ONCE
Status: COMPLETED | OUTPATIENT
Start: 2022-12-15 | End: 2022-12-15

## 2022-12-15 RX ORDER — LOSARTAN POTASSIUM 25 MG/1
25 TABLET ORAL
Status: DISCONTINUED | OUTPATIENT
Start: 2022-12-16 | End: 2022-12-15 | Stop reason: HOSPADM

## 2022-12-15 RX ORDER — LOSARTAN POTASSIUM 25 MG/1
25 TABLET ORAL
Qty: 30 TABLET | Refills: 0 | Status: SHIPPED | OUTPATIENT
Start: 2022-12-16 | End: 2022-12-15 | Stop reason: SDUPTHER

## 2022-12-15 RX ORDER — DILTIAZEM HYDROCHLORIDE 180 MG/1
360 CAPSULE, COATED, EXTENDED RELEASE ORAL
Status: DISCONTINUED | OUTPATIENT
Start: 2022-12-15 | End: 2022-12-15 | Stop reason: HOSPADM

## 2022-12-15 RX ORDER — DILTIAZEM HYDROCHLORIDE 360 MG/1
360 CAPSULE, EXTENDED RELEASE ORAL
Qty: 30 CAPSULE | Refills: 0 | Status: SHIPPED | OUTPATIENT
Start: 2022-12-16 | End: 2022-12-15 | Stop reason: SDUPTHER

## 2022-12-15 RX ORDER — LOSARTAN POTASSIUM 25 MG/1
25 TABLET ORAL
Qty: 30 TABLET | Refills: 0 | Status: SHIPPED | OUTPATIENT
Start: 2022-12-16 | End: 2023-01-09 | Stop reason: SDUPTHER

## 2022-12-15 RX ORDER — DILTIAZEM HYDROCHLORIDE 360 MG/1
360 CAPSULE, EXTENDED RELEASE ORAL
Qty: 30 CAPSULE | Refills: 0 | Status: SHIPPED | OUTPATIENT
Start: 2022-12-16 | End: 2023-01-09 | Stop reason: SDUPTHER

## 2022-12-15 RX ADMIN — Medication 10 ML: at 08:25

## 2022-12-15 RX ADMIN — LOSARTAN POTASSIUM 25 MG: 25 TABLET, FILM COATED ORAL at 08:33

## 2022-12-15 RX ADMIN — TRIAMCINOLONE ACETONIDE 1 APPLICATION: 1 CREAM TOPICAL at 08:33

## 2022-12-15 RX ADMIN — DILTIAZEM HYDROCHLORIDE 360 MG: 180 CAPSULE, COATED, EXTENDED RELEASE ORAL at 08:33

## 2022-12-15 RX ADMIN — DOCUSATE SODIUM 50MG AND SENNOSIDES 8.6MG 2 TABLET: 8.6; 5 TABLET, FILM COATED ORAL at 08:33

## 2022-12-15 RX ADMIN — DILTIAZEM HYDROCHLORIDE 90 MG: 60 TABLET, FILM COATED ORAL at 06:50

## 2022-12-15 NOTE — PLAN OF CARE
Goal Outcome Evaluation:     ICU Tx, Bp's lowering with PO Diltiazem 90 mg, up ad casimiro to BR, VSS, ice for left eye swelling, no PRN meds needed, will CTM

## 2022-12-15 NOTE — CONSULTS
OPHTHALMOLOGY CONSULT NOTE    Patient Identification:  Name: Emmanuel Huizar  Age: 67 y.o.  Sex: male  :  1955  MRN: [id number]                                               Requesting Physician: per order  Reason for consult: periorbital hematoma    History of Present Illness:  67 y.o. male who presents after a fall and admitted for atrial fibrillation. Ophthalmology consulted for periorbital hematoma. Patient reports no vision change, no ocular complaints and no pain.     Problem List:  Principal Problem:    Atrial fibrillation (HCC)  Active Problems:    Syncope, unspecified syncope type    SAH (subarachnoid hemorrhage) (HCC)    Traumatic subdural hemorrhage with loss of consciousness of 30 minutes or less (HCC)    Periorbital hematoma of left eye    Closed head injury    History of COVID-19 twice      Past Medical History:  No past medical history on file.    Past Surgical History:  No past surgical history on file.     Past Ocular History: none    ROS:  Pertinent items are noted in HPI      Ocular Medication: none    Home Meds:  No medications prior to admission.       Current Meds:     Current Facility-Administered Medications:   •  aluminum-magnesium hydroxide-simethicone (MAALOX MAX) 400-400-40 MG/5ML suspension 15 mL, 15 mL, Oral, Q6H PRN, Shukri Steinberg MD  •  benzonatate (TESSALON) capsule 100 mg, 100 mg, Oral, TID PRN, Mesha Griffin MD  •  sennosides-docusate (PERICOLACE) 8.6-50 MG per tablet 2 tablet, 2 tablet, Oral, BID **AND** polyethylene glycol (MIRALAX) packet 17 g, 17 g, Oral, Daily PRN **AND** bisacodyl (DULCOLAX) EC tablet 5 mg, 5 mg, Oral, Daily PRN **AND** bisacodyl (DULCOLAX) suppository 10 mg, 10 mg, Rectal, Daily PRN, Mesha Griffin MD  •  dilTIAZem (CARDIZEM) tablet 90 mg, 90 mg, Oral, Q6H, Mesha Griffin MD, 90 mg at 22  •  metoprolol tartrate (LOPRESSOR) injection 5 mg, 5 mg, Intravenous, Q4H PRN, Shukri Steinberg MD, 5 mg  at 12/14/22 1135  •  ondansetron (ZOFRAN) injection 4 mg, 4 mg, Intravenous, Q6H PRN, Shukri Steinberg MD  •  sodium chloride 0.9 % flush 1-10 mL, 1-10 mL, Intravenous, PRN, Shukri Steinberg MD  •  sodium chloride 0.9 % flush 10 mL, 10 mL, Intravenous, Q12H, Shukri Steinberg MD, 10 mL at 12/14/22 0818  •  sodium chloride 0.9 % infusion 40 mL, 40 mL, Intravenous, PRN, Shukri Steinberg MD  •  triamcinolone (KENALOG) 0.1 % cream 1 application, 1 application, Topical, Q12H, Mesha Griffin MD    Allergies:  No Known Allergies    Social History:   Social History     Tobacco Use   • Smoking status: Never     Passive exposure: Never   • Smokeless tobacco: Never   Substance Use Topics   • Alcohol use: Yes     Alcohol/week: 3.0 standard drinks     Types: 3 Shots of liquor per week        Family History:  Denies h/o glaucoma, retinal detachment, strabismus, amblyopia    Objective:  General Appearance: NAD    Exam:  Vision checked with +2.00 readers and near card  VA cc near P T EOM    20/40 5->3mm no apd wnl Full    20/50 5->3mm no apd wnl Full      PLE  With 20D lens at bedside     OD OS   External/Lid wnl periorbital edema/ecchymosis   Conj/sclera White/quiet White/quiet   Cornea clear clear   Anterior Chamber formed formed   Iris Round/reactive Round/reactive   Lens clear clear   Vitreous clear clear       Imaging:  CT Head Without Contrast   Final Result   No significant interval change in previously described left   frontoparietal subdural hematoma and trace parietal subarachnoid   hemorrhage.       Radiation dose reduction techniques were utilized, including automated   exposure control and exposure modulation based on body size.       This report was finalized on 12/14/2022 5:31 AM by Dr. Nae Campo M.D.          CT Head Without Contrast   Final Result   2 mm left frontal subdural hematoma, as well as a trace amount of   subarachnoid hemorrhage within the left parietal  lobe. There is no   midline shift.       FINDINGS were called to Dr. Li at 10:35 PM.       Radiation dose reduction techniques were utilized, including automated   exposure control and exposure modulation based on body size.       This report was finalized on 12/13/2022 10:37 PM by Dr. Nae Campo M.D.          XR Chest 1 View   Final Result   No focal pulmonary consolidation. Borderline heart size.   Tortuous aorta. Follow-up as clinical indications persist.       This report was finalized on 12/13/2022 8:54 PM by Dr. Brandon Dotson M.D.                  Result Review:  I have personally reviewed the following results :  []  Laboratory  []  Microbiology  [x]  Radiology  []  EKG/Telemetry   []  Cardiology/Vascular   []  Pathology  []  Old records  []  Other:  Most notable findings include: negative for orbital fracture or retro-bulbar hematoma    Assessment/Recommendations:  Emmanuel Huizar is a 67 y.o.     1. Periorbital hematoma    -Patient presents after a fall  -No vision change, no eye pain, no issues with EOM  -CT scan personally reviewed, no orbital fracture, no retro-orbital hematoma  -Observe, discussed with patient this will resolve with time    Patient can follow up as needed. If he would like to follow up with [facility] patient can call for appointment at [phone].    Thank you for the consult. Please do not hesitate to call with questions or concerns.     Gin Burgess MD  12/14/2022 20:09 EST

## 2022-12-15 NOTE — CASE MANAGEMENT/SOCIAL WORK
Continued Stay Note  [facility]     Patient Name: Emmanuel Huziar  MRN: [id number]  Today's Date: 12/15/2022    Admit Date: 12/13/2022    Plan: plan is home via personal vehicle.   Discharge Plan     Row Name 12/15/22 1513       Plan    Plan plan is home via personal vehicle.    Roadmap to Recovery Yes    Patient/Family in Agreement with Plan yes    Plan Comments Spoke with patient at bedside. Introduced self and explained role of CCP. Verified face sheet and confirmed pharmacy as [facility] [address], patient denies difficulty with medication cost/ management. Patient lives in [facility] with wife/ Ellen Huizar ([phone]). Patient is [employer] and reports that he has no difficulty with providing transportation for himself for daily needs/ medical needs. Patient denies any needs from CCP at this time. SHENG Velázquez RN, CCP               Discharge Codes    No documentation.               Expected Discharge Date and Time     Expected Discharge Date Expected Discharge Time    Dec 15, 2022             Sunita Velázquez RN

## 2022-12-15 NOTE — DISCHARGE SUMMARY
Patient Name: Emmanuel Huizar  : 1955  MRN: [id number]    Date of Admission: 2022  Date of Discharge:  12/15/2022  Primary Care Physician: Provider, No Known      Chief Complaint:   Syncope      Discharge Diagnoses     Active Hospital Problems    Diagnosis  POA   • **Atrial fibrillation (HCC) [I48.91]  Yes   • SAH (subarachnoid hemorrhage) (HCC) [I60.9]  Yes   • Traumatic subdural hemorrhage with loss of consciousness of 30 minutes or less (HCC) [S06.5X1A]  Yes   • Periorbital hematoma of left eye [H05.232]  Yes   • Closed head injury [S09.90XA]  Yes   • History of COVID-19 twice [Z86.16]  Yes   • Syncope, unspecified syncope type [R55]  Yes      Resolved Hospital Problems   No resolved problems to display.        Hospital Course     Patient is a 67-year-old male with no significant past medical history and not on medication at home, presented after syncopal episode.  Patient was leaving dinner and walking through a parking lot when he became dizzy and passed out and fell to the ground and hit his head.  On presentation patient was found to have subarachnoid hemorrhage and large left periorbital hematoma.  Patient was also found to have new onset A. fib with RVR.  Syncope  felt to be secondary to A. fib RVR.  Neurosurgery, ophthalmology, cardiology were consulted.   Neurosurgery evaluated, no interventions, recommend to follow-up in clinic in 10 to 14 days with repeat CT head.  No anticoagulation was recommended until follow-up.  Cardiology consulted, initiated on diltiazem improvement of heart rate.  Discharged on diltiazem 360 mg daily.  Blood pressure was also on the higher side, so low-dose losartan 25 mg was added to the regimen. Echocardiogram was obtained, with no acute findings, did show mild to moderate tricuspid regurg and mildly elevated RSVP.  Patient was stable to be discharged from cardiology standpoint.  Ophthalmology evaluated for periorbital hematoma.  No interventions, no vision  "changes, hematoma to resolve with time, recommende follow-up as needed.      Macrocytosis: B12 was normal.  Patient with history of alcohol use drinks 2 alcoholic beverages 3 times a week per report.  Intake may be higher than reported and  macrocytosis could be related to alcohol.  Follow-up with PCP for outpatient CBC and folate level.  Alcohol cessation encouraged.      Echocardiogram showed mildly elevated RSVP and mild to moderate TR.  Outpatient sleep apnea study recommended in the setting of tachycardia findings and new onset A. fib.    At the time of discharge patient was told to take all medications as prescribed, keep all follow-up appointments, and call their doctor or return to the hospital with any worsening or concerning symptoms.           Day of Discharge     Subjective:  Patient seen this morning.  states he is doing fine and he wants to be discharged, denies any complaints.  Denies chest pain, palpitations, shortness breath, fevers, chills, nausea, vomiting.    Physical Exam:  Temp:  [97.5 °F (36.4 °C)-98.6 °F (37 °C)] 97.5 °F (36.4 °C)  Heart Rate:  [] 83  Resp:  [18-20] 20  BP: (113-152)/() 113/78  Body mass index is 22.15 kg/m².  Physical Exam    General: Alert and oriented x3, no acute distress  HEENT: Normocephalic, large left periorbital edema  CV: Irregular rhythm, heart rate 90s-110 range.  Lungs: Clear to auscultation bilaterally, no crackles or wheezes  Abdomen: Soft, nontender, nondistended  Extremities: No significant peripheral edema , no cyanosis     Consultants     Consult Orders (all) (From admission, onward)     Start     Ordered    12/14/22 1356  Inpatient Internal Medicine Consult  Once        Specialty:  Internal Medicine  Provider:  Emerson Richmond MD    12/14/22 1356    12/14/22 0952  Inpatient Ophthalmology Consult  Once        Comments: S/W \"Dr. Burgess\" @# [phone] @0925- [email or web address].LISA Willingham   Specialty:  Ophthalmology  Provider:  Junior Parekh MD    " 12/14/22 0931    12/14/22 0702  Inpatient Cardiology Consult  IN AM        Specialty:  Cardiology  Provider:  Sabino Aldrich MD    12/13/22 2342 12/14/22 0500  Inpatient Ophthalmology Consult  Once,   Status:  Canceled        Specialty:  Ophthalmology  Provider:  Edgar Bell MD    12/13/22 2342 12/13/22 2242  Inpatient Neurosurgery Consult  STAT        Specialty:  Neurosurgery  Provider:  Abdoul Alonzo MD    12/13/22 2242 12/13/22 2242  Inpatient Pulmonology Consult  STAT,   Status:  Canceled        Specialty:  Pulmonary Disease  Provider:  Kevin Terrazas MD    12/13/22 2242 12/13/22 2222  Inpatient Cardiology Consult  Once,   Status:  Canceled        Specialty:  Cardiology  Provider:  (Not yet assigned)    12/13/22 2223              Procedures     * Surgery not found *      Imaging Results (All)     Procedure Component Value Units Date/Time    CT Head Without Contrast [[id number]] Collected: 12/14/22 0526     Updated: 12/14/22 0534    Narrative:      CT HEAD WITHOUT CONTRAST     HISTORY: Subdural hematoma and subarachnoid hemorrhage.     COMPARISON: 12/13/2022     TECHNIQUE: Axial CT imaging was obtained through the brain. No IV  contrast was administered.     FINDINGS:  As was previously discussed, there is a small left frontoparietal mildly  hyperdense subdural hematoma. Given that it is only mildly hyperdense,  this may be more subacute. It measures approximately 3 mm in thickness,  which is unchanged when compared to the earlier exam. There is no  midline shift. Trace amount of subarachnoid hemorrhage is noted within  the left parietal lobe appears to be stable in volume when compared to  the prior exam. No new areas of hemorrhage are seen. There is mild  atrophy. There is periventricular and deep white matter microangiopathic  change. There is a left supraorbital and periorbital hematoma. This  extends more posteriorly on the current exam, although the thickness is  similar to the prior  study.       Impression:      No significant interval change in previously described left  frontoparietal subdural hematoma and trace parietal subarachnoid  hemorrhage.     Radiation dose reduction techniques were utilized, including automated  exposure control and exposure modulation based on body size.     This report was finalized on 12/14/2022 5:31 AM by Dr. Nae Campo M.D.       CT Head Without Contrast [[id number]] Collected: 12/13/22 2223     Updated: 12/13/22 2241    Narrative:      CT HEAD WITHOUT CONTRAST     HISTORY: Fall     COMPARISON: None available.     TECHNIQUE: Axial CT imaging was obtained through the brain. No IV  contrast was administered.     FINDINGS:  There is trace mildly hyperdense material overlying the left frontal  lobe. Appearance is concerning for a small subdural hematoma. In  addition, the patient is noted to have a small amount of subarachnoid  hemorrhage within the left parietal lobe. This is best seen on images  33-35 of the axial series. There is mild atrophy. There is no midline  shift or mass effect. No obvious calvarial fracture is seen. There is  left periorbital soft tissue swelling. Left globe appears intact. No  definite post septal extension is seen. Paranasal sinuses and mastoid  air cells appear clear.       Impression:      2 mm left frontal subdural hematoma, as well as a trace amount of  subarachnoid hemorrhage within the left parietal lobe. There is no  midline shift.     FINDINGS were called to Dr. Li at 10:35 PM.     Radiation dose reduction techniques were utilized, including automated  exposure control and exposure modulation based on body size.     This report was finalized on 12/13/2022 10:37 PM by Dr. Nae Campo M.D.       XR Chest 1 View [[id number]] Collected: 12/13/22 2052     Updated: 12/13/22 2057    Narrative:      XR CHEST 1 VW-     HISTORY: Male who is 67 years-old,  palpitations     TECHNIQUE: Frontal views of the chest      COMPARISON: None available     FINDINGS: The heart size is borderline. Aorta is tortuous. Pulmonary  vasculature is unremarkable. No focal pulmonary consolidation, pleural  effusion, or pneumothorax. Gas-filled bowel is apparent under the left  hemidiaphragm, only partly included, correlate clinically. No acute  osseous process.       Impression:      No focal pulmonary consolidation. Borderline heart size.  Tortuous aorta. Follow-up as clinical indications persist.     This report was finalized on 12/13/2022 8:54 PM by Dr. Brandon Dotson M.D.             Results for orders placed during the hospital encounter of 12/13/22    Adult Transthoracic Echo Complete W/ Cont if Necessary Per Protocol    Interpretation Summary  •  Left ventricular systolic function is normal. Left ventricular ejection fraction appears to be 56 - 60%. Normal left ventricular cavity size and wall thickness noted. All left ventricular wall segments contract normally. Left ventricular diastolic function was indeterminate.  •  Both atria are moderately dilated.  •  Mild to moderate tricuspid valve regurgitation is present. Estimated right ventricular systolic pressure from tricuspid regurgitation is mildly elevated (35-45 mmHg). Calculated right ventricular systolic pressure from tricuspid regurgitation is 40 mmHg.    Pertinent Labs     Results from last 7 days   Lab Units 12/15/22  0530 12/13/22 2047   WBC 10*3/mm3 9.02 7.62   HEMOGLOBIN g/dL 13.4 16.2   PLATELETS 10*3/mm3 215 253     Results from last 7 days   Lab Units 12/15/22  0530 12/13/22 2047   SODIUM mmol/L 140 137   POTASSIUM mmol/L 3.6 4.1   CHLORIDE mmol/L 107 103   CO2 mmol/L 21.6* 23.5   BUN mg/dL 15 17   CREATININE mg/dL 0.83 1.23   GLUCOSE mg/dL 91 105*   Estimated Creatinine Clearance: 99.6 mL/min (by C-G formula based on SCr of 0.83 mg/dL).  Results from last 7 days   Lab Units 12/15/22  0530 12/13/22  2047   ALBUMIN g/dL 4.20 4.70   BILIRUBIN mg/dL 1.1 0.8   ALK PHOS  U/L 60 75   AST (SGOT) U/L 17 24   ALT (SGPT) U/L 14 19     Results from last 7 days   Lab Units 12/15/22  0530 12/13/22  2047   CALCIUM mg/dL 8.4* 9.1   ALBUMIN g/dL 4.20 4.70   MAGNESIUM mg/dL  --  2.4       Results from last 7 days   Lab Units 12/13/22 2047   TROPONIN T ng/mL <0.010           Invalid input(s): LDLCALC          Test Results Pending at Discharge     Pending Labs     Order Current Status    Folate RBC In process          Discharge Details        Discharge Medications      New Medications      Instructions Start Date   dilTIAZem  MG 24 hr capsule  Commonly known as: CARDIZEM CD   360 mg, Oral, Every 24 Hours Scheduled   Start Date: December 16, 2022     losartan 25 MG tablet  Commonly known as: COZAAR   25 mg, Oral, Every 24 Hours Scheduled   Start Date: December 16, 2022            No Known Allergies    Discharge Disposition:  Home or Self Care      Discharge Diet:  Diet Order   Procedures   • Diet: Cardiac Diets; Healthy Heart (2-3 Na+); Texture: Regular Texture (IDDSI 7); Fluid Consistency: Thin (IDDSI 0)       Discharge Activity:       CODE STATUS:    Code Status and Medical Interventions:   Ordered at: 12/14/22 0749     Code Status (Patient has no pulse and is not breathing):    CPR (Attempt to Resuscitate)     Medical Interventions (Patient has pulse or is breathing):    Full Support     Release to patient:    Routine Release       Future Appointments   Date Time Provider Department Center   1/5/2023  3:30 PM [facility] CT 1  [facility]      Follow-up Information     Provider, No Known Follow up in 1 week(s).    Contact information:  [facility] [id number] [phone]             Demar Elder III, MD Follow up in 2 week(s).    Specialty: Cardiology  Contact information:  [address]  [facility] [id number] [phone]             Madeleine Velázquez APRN Follow up.    Specialty: Neurosurgery  Contact information:  [address]  [facility]  [address]  [phone]                         Time Spent on Discharge:  Greater than 30 minutes      Geovanna Cruz MD  [facility]  12/15/22  14:10 EST

## 2022-12-15 NOTE — SIGNIFICANT NOTE
12/15/22 0947   OTHER   Discipline physical therapist   Rehab Time/Intention   Session Not Performed other (see comments)  (pt up ad casimiro, declines therapy needs, RN confirms patient up independently in room.)

## 2022-12-15 NOTE — SIGNIFICANT NOTE
MD, all of our overnight pulse oximeters are currently in use. We can try for study tomorrow night. Thank you

## 2022-12-15 NOTE — PROGRESS NOTES
"    Patient Name: Emmanuel Huizar  :1955  67 y.o.      Patient Care Team:  Provider, No Known as PCP - General    Chief Complaint: fall, ICH    Interval History: moved to floor, echo yesterday   Results for orders placed during the hospital encounter of 22    Adult Transthoracic Echo Complete W/ Cont if Necessary Per Protocol    Interpretation Summary  •  Left ventricular systolic function is normal. Left ventricular ejection fraction appears to be 56 - 60%. Normal left ventricular cavity size and wall thickness noted. All left ventricular wall segments contract normally. Left ventricular diastolic function was indeterminate.  •  Both atria are moderately dilated.  •  Mild to moderate tricuspid valve regurgitation is present. Estimated right ventricular systolic pressure from tricuspid regurgitation is mildly elevated (35-45 mmHg). Calculated right ventricular systolic pressure from tricuspid regurgitation is 40 mmHg.        Objective   Vital Signs  Temp:  [97.5 °F (36.4 °C)-98.6 °F (37 °C)] 97.5 °F (36.4 °C)  Heart Rate:  [] 83  Resp:  [18-20] 20  BP: (118-160)/() 151/101    Intake/Output Summary (Last 24 hours) at 12/15/2022 0744  Last data filed at 2022 1203  Gross per 24 hour   Intake --   Output 550 ml   Net -550 ml     Flowsheet Rows    Flowsheet Row First Filed Value   Admission Height 190.5 cm (75\") Documented at 2022   Admission Weight 90.7 kg (200 lb) Documented at 2022          Physical Exam:   General Appearance:    Alert, cooperative, in no acute distress   Lungs:     Clear to auscultation.  Normal respiratory effort and rate.      Heart:    irregular rhythm and normal rate, normal S1 and S2, no murmurs, gallops or rubs.     Chest Wall:    No abnormalities observed   Abdomen:     Soft, nontender, positive bowel sounds.     Extremities:   no cyanosis, clubbing or edema.  No marked joint deformities.  Adequate musculoskeletal strength.       Results " Review:    Results from last 7 days   Lab Units 12/15/22  0530   SODIUM mmol/L 140   POTASSIUM mmol/L 3.6   CHLORIDE mmol/L 107   CO2 mmol/L 21.6*   BUN mg/dL 15   CREATININE mg/dL 0.83   GLUCOSE mg/dL 91   CALCIUM mg/dL 8.4*     Results from last 7 days   Lab Units 12/13/22 2047   TROPONIN T ng/mL <0.010     Results from last 7 days   Lab Units 12/15/22  0530   WBC 10*3/mm3 9.02   HEMOGLOBIN g/dL 13.4   HEMATOCRIT % 38.2   PLATELETS 10*3/mm3 215     Results from last 7 days   Lab Units 12/13/22  2047   INR  1.04   APTT seconds 26.8     Results from last 7 days   Lab Units 12/13/22 2047   MAGNESIUM mg/dL 2.4                   Medication Review:   dilTIAZem, 90 mg, Oral, Q6H  losartan, 25 mg, Oral, Once  senna-docusate sodium, 2 tablet, Oral, BID  sodium chloride, 10 mL, Intravenous, Q12H  triamcinolone, 1 application, Topical, Q12H              Assessment & Plan     Active Hospital Problems    Diagnosis  POA   • **Atrial fibrillation (HCC) [I48.91]  Yes   • SAH (subarachnoid hemorrhage) (HCC) [I60.9]  Yes   • Traumatic subdural hemorrhage with loss of consciousness of 30 minutes or less (HCC) [S06.5X1A]  Yes   • Periorbital hematoma of left eye [H05.232]  Yes   • Closed head injury [S09.90XA]  Yes   • History of COVID-19 twice [Z86.16]  Yes   • Syncope, unspecified syncope type [R55]  Yes      Resolved Hospital Problems   No resolved problems to display.     1.  Fall, probable syncope-patient is not entirely sure if he blacked out he does not have any independent recollection. Easily could have had syncope due to AFIB with RVR  2.  Traumatic subdural hematoma, neurosurgery following  3.  Hypertension- blood pressure target per primary team and neurosurgery  4.  Atrial fibrillation, persistent, with RVR-   Echo shows MORAIMA, so this has been present for some time. He easily could have been in atrial fibrillation with RVR which contributed to his initial event.  Heart rate is under much better control, on oral meds.  Will switch to once daily diltiazem.  No anticoagulation at this point, patient cannot receive anticoagulation until deemed appropriate by neurosurgery    Pt states that he will be d/c'd today.  OK from our standpoint, will plan on seeing in office as an OP.     Will sign off, call if we can help in any way    Demar Elder III, MD  [facility]  12/15/22  07:44 EST

## 2022-12-15 NOTE — TELEPHONE ENCOUNTER
Madeleine, I got the patient scheduled for a head CT but the only availability they had was Dec 22nd or January 5th. I took the Dec 22nd. Is this too soon?

## 2022-12-15 NOTE — CASE MANAGEMENT/SOCIAL WORK
Discharge Planning Assessment  [facility]     Patient Name: Emmanuel Huizar  MRN: [id number]  Today's Date: 12/15/2022    Admit Date: 12/13/2022    Plan: plan is home via personal vehicle.   Discharge Needs Assessment     Row Name 12/15/22 1519       Living Environment    People in Home spouse    Name(s) of People in Home Ellen Huizar    Current Living Arrangements condominium    Primary Care Provided by self    Provides Primary Care For no one    Family Caregiver if Needed spouse    Quality of Family Relationships unable to assess    Able to Return to Prior Arrangements yes       Resource/Environmental Concerns    Resource/Environmental Concerns none    Transportation Concerns none       Transition Planning    Patient/Family Anticipates Transition to home with family    Patient/Family Anticipated Services at Transition none    Transportation Anticipated family or friend will provide       Discharge Needs Assessment    Readmission Within the Last 30 Days no previous admission in last 30 days    Equipment Currently Used at Home none    Concerns to be Addressed no discharge needs identified    Anticipated Changes Related to Illness none    Equipment Needed After Discharge none               Discharge Plan     Row Name 12/15/22 4443       Plan    Plan plan is home via personal vehicle.    Roadmap to Recovery Yes    Patient/Family in Agreement with Plan yes    Plan Comments Spoke with patient at bedside. Introduced self and explained role of CCP. Verified face sheet and confirmed pharmacy as [facility] [address], patient denies difficulty with medication cost/ management. [place] in [place] with wife/ Ellen Huizar ([phone]). Patient is IADLs and reports that he has no difficulty with providing transportation for himself for daily needs/ medical needs. Patient denies any needs from CCP at this time. SHENG Velázquez RN, CCP              Continued Care and Services - Discharged  on 12/15/2022 Admission date: 12/13/2022 - Discharge disposition: Home or Self Care   Coordination has not been started for this encounter.       Expected Discharge Date and Time     Expected Discharge Date Expected Discharge Time    Dec 15, 2022          Demographic Summary     Row Name 12/15/22 1516       General Information    Admission Type inpatient    Arrived From hospital    Required Notices Provided Important Message from Medicare    Referral Source admission list    Preferred Language English       Contact Information    Permission Granted to Share Info With                Functional Status     Row Name 12/15/22 1517       Functional Status    Usual Activity Tolerance good    Current Activity Tolerance good       Functional Status, IADL    Medications independent    Meal Preparation independent    Housekeeping independent    Laundry independent    Shopping independent       Mental Status    General Appearance WDL WDL       Mental Status Summary    Recent Changes in Mental Status/Cognitive Functioning no changes               Psychosocial     Row Name 12/15/22 1518       Developmental Stage (Eriksson's)    Developmental Stage Stage 8 (66 years-death/Late Adulthood) Integrity vs. Despair               Abuse/Neglect    No documentation.                Legal    No documentation.                Substance Abuse    No documentation.                Patient Forms    No documentation.                   Sunita Velázquez RN

## 2022-12-15 NOTE — TELEPHONE ENCOUNTER
Please schedule HFU w/ new head CT after January 5th.         I called and spoke with Reanna the patients nurse and advised her of head CT appointment. She voiced understanding.

## 2022-12-15 NOTE — SIGNIFICANT NOTE
12/15/22 0922   OTHER   Discipline occupational therapist   Rehab Time/Intention   Session Not Performed other (see comments)  (spoke to pt at bedside, he reports he has been getting up independently, no neuro deficits. Plans leave today and declines need for OT eval.)

## 2022-12-15 NOTE — TELEPHONE ENCOUNTER
----- Message from JACKY Olivier sent at 12/14/2022 12:38 PM EST -----  Regarding: HFU  Please schedule patient a follow-up visit in 10-14 days with head CT.  Can be with APC.  Patient was seen in the hospital for small left frontal subdural hematoma and trace left parietal subarachnoid hemorrhage after syncopal episode.

## 2022-12-16 LAB
FOLATE BLD-MCNC: 279 NG/ML
FOLATE RBC-MCNC: 714 NG/ML
HCT VFR BLD AUTO: 39.1 % (ref 37.5–51)

## 2022-12-16 NOTE — OUTREACH NOTE
Prep Survey    Flowsheet Row Responses   [facility] facility patient discharged from? [place]   Is LACE score < 7 ? Yes   Eligibility Readm Mgmt   Discharge diagnosis small subarachnoid and subdural hemorrhage on CT head.   Does the patient have one of the following disease processes/diagnoses(primary or secondary)? Stroke   Does the patient have Home health ordered? No   Is there a DME ordered? No   Prep survey completed? Yes          CHONG HIRSCH - Registered Nurse

## 2022-12-16 NOTE — CASE MANAGEMENT/SOCIAL WORK
Case Management Discharge Note      Final Note: Home no additional dc needs noted for CCP. LUCIANO RN/CCP    Provided Post Acute Provider List?: N/A  Provided Post Acute Provider Quality & Resource List?: N/A    Selected Continued Care - Discharged on 12/15/2022 Admission date: 12/13/2022 - Discharge disposition: Home or Self Care    Destination    No services have been selected for the patient.              Durable Medical Equipment    No services have been selected for the patient.              Dialysis/Infusion    No services have been selected for the patient.              Home Medical Care    No services have been selected for the patient.              Therapy    No services have been selected for the patient.              Community Resources    No services have been selected for the patient.              Community & DME    No services have been selected for the patient.                  Transportation Services  Private: Car    Final Discharge Disposition Code: 01 - home or self-care

## 2022-12-19 ENCOUNTER — READMISSION MANAGEMENT (OUTPATIENT)
Dept: CALL CENTER | Facility: HOSPITAL | Age: 67
End: 2022-12-19

## 2022-12-19 NOTE — OUTREACH NOTE
Stroke Week 1 Survey    Flowsheet Row Responses   [facility] facility patient discharged from? [place]   Does the patient have one of the following disease processes/diagnoses(primary or secondary)? Stroke   Week 1 attempt successful? No   Unsuccessful attempts Attempt 1          JERMAINE GARCIA - Registered Nurse

## 2022-12-21 ENCOUNTER — READMISSION MANAGEMENT (OUTPATIENT)
Dept: CALL CENTER | Facility: HOSPITAL | Age: 67
End: 2022-12-21

## 2022-12-21 NOTE — OUTREACH NOTE
Stroke Week 1 Survey    Flowsheet Row Responses   [facility] facility patient discharged from? [place]   Does the patient have one of the following disease processes/diagnoses(primary or secondary)? Stroke   Week 1 attempt successful? No   Unsuccessful attempts Attempt 2          TAY GUTIERREZ - Registered Nurse

## 2022-12-22 ENCOUNTER — APPOINTMENT (OUTPATIENT)
Dept: CT IMAGING | Facility: HOSPITAL | Age: 67
End: 2022-12-22

## 2022-12-27 ENCOUNTER — READMISSION MANAGEMENT (OUTPATIENT)
Dept: CALL CENTER | Facility: HOSPITAL | Age: 67
End: 2022-12-27

## 2022-12-27 NOTE — OUTREACH NOTE
Stroke Week 2 Survey    Flowsheet Row Responses   [facility] facility patient discharged from? [place]   Does the patient have one of the following disease processes/diagnoses(primary or secondary)? Stroke   Week 2 attempt successful? No   Unsuccessful attempts Attempt 1          TAY GUTIERREZ - Registered Nurse

## 2022-12-30 ENCOUNTER — READMISSION MANAGEMENT (OUTPATIENT)
Dept: CALL CENTER | Facility: HOSPITAL | Age: 67
End: 2022-12-30

## 2022-12-30 NOTE — OUTREACH NOTE
Stroke Week 2 Survey    Flowsheet Row Responses   [facility] facility patient discharged from? [place]   Does the patient have one of the following disease processes/diagnoses(primary or secondary)? Stroke   Week 2 attempt successful? No   Unsuccessful attempts Attempt 2          MONET MARTÍNEZ - Licensed Nurse

## 2023-01-05 ENCOUNTER — HOSPITAL ENCOUNTER (OUTPATIENT)
Dept: CT IMAGING | Facility: HOSPITAL | Age: 68
Discharge: HOME | End: 2023-01-05
Admitting: NURSE PRACTITIONER
Payer: MEDICARE

## 2023-01-05 DIAGNOSIS — I60.9 SAH (SUBARACHNOID HEMORRHAGE): ICD-10-CM

## 2023-01-05 DIAGNOSIS — S06.5X1A TRAUMATIC SUBDURAL HEMORRHAGE WITH LOSS OF CONSCIOUSNESS OF 30 MINUTES OR LESS, INITIAL ENCOUNTER: ICD-10-CM

## 2023-01-05 PROCEDURE — 70450 CT HEAD/BRAIN W/O DYE: CPT

## 2023-01-09 RX ORDER — DILTIAZEM HYDROCHLORIDE 360 MG/1
360 CAPSULE, EXTENDED RELEASE ORAL
Qty: 30 CAPSULE | Refills: 0 | Status: SHIPPED | OUTPATIENT
Start: 2023-01-09 | End: 2023-02-14

## 2023-01-09 RX ORDER — LOSARTAN POTASSIUM 25 MG/1
25 TABLET ORAL
Qty: 30 TABLET | Refills: 0 | Status: SHIPPED | OUTPATIENT
Start: 2023-01-09 | End: 2023-02-14

## 2023-01-11 ENCOUNTER — READMISSION MANAGEMENT (OUTPATIENT)
Dept: CALL CENTER | Facility: HOSPITAL | Age: 68
End: 2023-01-11
Payer: MEDICARE

## 2023-01-11 NOTE — OUTREACH NOTE
Stroke Week 4 Survey    Flowsheet Row Responses   [facility] facility patient discharged from? [place]   Does the patient have one of the following disease processes/diagnoses(primary or secondary)? Stroke   Week 4 attempt successful? No          JOSE ALBERTO REARDON - Registered Nurse

## 2023-01-18 ENCOUNTER — TELEPHONE (OUTPATIENT)
Dept: NEUROSURGERY | Facility: CLINIC | Age: 68
End: 2023-01-18

## 2023-01-18 ENCOUNTER — OFFICE VISIT (OUTPATIENT)
Dept: CARDIOLOGY | Facility: CLINIC | Age: 68
End: 2023-01-18
Payer: MEDICARE

## 2023-01-18 VITALS
DIASTOLIC BLOOD PRESSURE: 70 MMHG | WEIGHT: 187.8 LBS | SYSTOLIC BLOOD PRESSURE: 114 MMHG | BODY MASS INDEX: 23.35 KG/M2 | HEART RATE: 111 BPM | HEIGHT: 75 IN

## 2023-01-18 DIAGNOSIS — I48.19 PERSISTENT ATRIAL FIBRILLATION: Primary | ICD-10-CM

## 2023-01-18 DIAGNOSIS — I10 PRIMARY HYPERTENSION: ICD-10-CM

## 2023-01-18 PROCEDURE — 99214 OFFICE O/P EST MOD 30 MIN: CPT | Performed by: INTERNAL MEDICINE

## 2023-01-18 NOTE — PROGRESS NOTES
[facility]      Patient Name: Emmanuel Huizar  :1955  Age: 67 y.o.  Encounter Provider:  Jonathan Gomez Jr, MD      Chief Complaint:   Chief Complaint   Patient presents with   • Atrial Fibrillation         HPI  Emmanuel Huizar is a 67 y.o. male with no previous medical history prior to recent hospitalization presents for hospital follow-up of atrial fibrillation with RVR and ICH.  Patient was walking out of a [employer] [place] and experienced syncope.  The fall caused significant facial trauma.  He was brought to the emergency room and found to be in atrial fibrillation with RVR.  Imaging revealed small subdural hematoma.  Patient was hospitalized and seen by neurosurgery.  Subdural hematoma was treated conservatively.  Atrial fibrillation with RVR treated by rate control since anticoagulation was contraindicated.  Patient was discharged with follow-up imaging that showed resolution of the subdural hematoma.  Patient has follow-up with neurosurgery on .  He denies palpitations, dizziness or syncope.  Resting heart rate today in clinic is 111 bpm.  No chest pain or shortness of air with activity.  He has had COVID twice and notes significant fatigue since the last episode but looking back on it he may have been atrial fibrillation the whole time per patient report.  He has 2 brothers who have experienced atrial fibrillation and undergone ablation.  No family history of coronary artery disease or sudden cardiac death.  No cardiac complaints at time of interview.  Patient states that he is a lifelong non-smoker who drinks socially and denies illicit drug use.      The following portions of the patient's history were reviewed and updated as appropriate: allergies, current medications, past family history, past medical history, past social history, past surgical history and problem list.      Review of Systems   Constitutional: Positive for malaise/fatigue. Negative for  "chills and fever.   HENT: Negative for hoarse voice and sore throat.    Eyes: Negative for double vision and photophobia.   Cardiovascular: Negative for chest pain, leg swelling, near-syncope, orthopnea, palpitations, paroxysmal nocturnal dyspnea and syncope.   Respiratory: Negative for cough and wheezing.    Skin: Negative for poor wound healing and rash.   Musculoskeletal: Negative for arthritis and joint swelling.   Gastrointestinal: Negative for bloating, abdominal pain, hematemesis and hematochezia.   Neurological: Negative for dizziness and focal weakness.   Psychiatric/Behavioral: Negative for depression and suicidal ideas.       OBJECTIVE:   Vital Signs  Vitals:    01/18/23 1140   BP: 114/70   Pulse: 111     Estimated body mass index is 23.47 kg/m² as calculated from the following:    Height as of this encounter: 190.5 cm (75\").    Weight as of this encounter: 85.2 kg (187 lb 12.8 oz).    Vitals reviewed.   Constitutional:       Appearance: Healthy appearance. Not in distress.   Neck:      Vascular: No JVR. JVD normal.   Pulmonary:      Effort: Pulmonary effort is normal.      Breath sounds: Normal breath sounds. No wheezing. No rhonchi. No rales.   Chest:      Chest wall: Not tender to palpatation.   Cardiovascular:      PMI at left midclavicular line. Tachycardia present. Irregularly irregular rhythm. Normal S1. Normal S2.      Murmurs: There is no murmur.      No gallop. No click. No rub.   Pulses:     Intact distal pulses.   Edema:     Peripheral edema absent.   Abdominal:      General: Bowel sounds are normal.      Palpations: Abdomen is soft.      Tenderness: There is no abdominal tenderness.   Musculoskeletal: Normal range of motion.         General: No tenderness. Skin:     General: Skin is warm and dry.   Neurological:      General: No focal deficit present.      Mental Status: Alert and oriented to person, place and time.           ECG 12 Lead    Date/Time: 1/19/2023 8:16 AM  Performed by: " Jonathan Gomez Jr., MD  Authorized by: Jonathan Gomez Jr., MD   Comparison: compared with previous ECG from 1/10/2023  Similar to previous ECG  Rhythm: atrial fibrillation  Rate: tachycardic  Other findings: non-specific ST-T wave changes    Clinical impression: abnormal EKG                  ASSESSMENT:     67-year-old male presents for hospital follow-up of intracranial hemorrhage and atrial fibrillation    PLAN OF CARE:     1. Persistent atrial fibrillation -we will need to await neurosurgery opinion on when anticoagulation can be started.  This will determine future options for therapeutic interventions.  Normal ejection fraction on echocardiogram with moderate left atrial dilation.  Place Holter monitor today to see what level of rate control we have at this time.  He was not on antihypertensives prior to hospitalization and I would prefer to stop ARB in favor of beta-blocker should we need more rate control.  2. Hypertension -as above  3. Subdural hemorrhage -neurosurgery follow-up    Return to clinic 3 months.  EP evaluation within a month.             Discharge Medications          Accurate as of January 18, 2023 11:43 AM. If you have any questions, ask your nurse or doctor.            Continue These Medications      Instructions Start Date   dilTIAZem  MG 24 hr capsule  Commonly known as: CARDIZEM CD   360 mg, Oral, Every 24 Hours Scheduled      losartan 25 MG tablet  Commonly known as: COZAAR   25 mg, Oral, Every 24 Hours Scheduled             Thank you for allowing me to participate in the care of your patient,      Sincerely,   Jonathan Gomez MD  [facility]  01/18/23  11:43 EST

## 2023-01-18 NOTE — TELEPHONE ENCOUNTER
Caller: Emmanuel Huizar    Relationship to patient: Self    Best call back number: [phone]    Patient is needing: PATIENT CALLED, PATIENT HAS HOSPITAL F/U SCHEDULED FOR 01/26/23. PATIENT WOULD LIKE TO KNOW WHY HE NEEDS THIS F/U AND WHAT WILL OCCUR @ THE APPT. PATIENT WOULD LIKE TO KNOW IF WE CAN CLEAR HIM TO BE ABLE TO GO ON BLOOD THINNERS TO HIS CARDIOLOGIST. PLEASE CALL PATIENT TO ADVISE.    THANK YOU

## 2023-01-19 ENCOUNTER — TELEPHONE (OUTPATIENT)
Dept: CARDIOLOGY | Facility: CLINIC | Age: 68
End: 2023-01-19
Payer: MEDICARE

## 2023-01-19 PROCEDURE — 93000 ELECTROCARDIOGRAM COMPLETE: CPT | Performed by: INTERNAL MEDICINE

## 2023-01-19 NOTE — TELEPHONE ENCOUNTER
Neurosurgery team have contacted me and state that he is okay to start blood thinners.  We will call and apixaban 5 mg twice daily.  He is currently wearing Holter monitor.  EP referral placed.

## 2023-01-19 NOTE — TELEPHONE ENCOUNTER
S/w patient.  Ok to cancel his appointment.  I sent a message to Dr. Gomez that he was ok to restart his anticoagulant.  Thanks!

## 2023-01-20 RX ORDER — DABIGATRAN ETEXILATE 150 MG/1
150 CAPSULE ORAL 2 TIMES DAILY
Qty: 60 CAPSULE | Refills: 11 | Status: SHIPPED | OUTPATIENT
Start: 2023-01-20 | End: 2023-01-26 | Stop reason: SDUPTHER

## 2023-01-24 ENCOUNTER — TELEPHONE (OUTPATIENT)
Dept: CARDIOLOGY | Facility: CLINIC | Age: 68
End: 2023-01-24
Payer: MEDICARE

## 2023-01-24 NOTE — TELEPHONE ENCOUNTER
"Notified patient of results/recommendations. Patient verbalized understanding. He has not started the Pradaxa yet. He said the pharmacy it was called into was going to charge him $350 for it. However, he found out that [facility] is a [facility] they will only charge him $79. He said his [facility] pharmacy is transferring the script over to his [facility] pharmacy. He has not been taking any anticoagulation. I advised him of the importance of needing to take this and needing to pick it up ASAP. He said he \"get it in the next couple days.\"    Vera Luna RN  Triage MG      "

## 2023-01-24 NOTE — TELEPHONE ENCOUNTER
----- Message from Jonathan Gomez Jr., MD sent at 1/24/2023  1:03 PM EST -----  Please tell patient that the atrial fibrillation is rate controlled on this study.  EP should be contacting him soon to get in for evaluation.  Please make sure he is having no side effects of Pradaxa therapy.

## 2023-01-26 RX ORDER — DABIGATRAN ETEXILATE 150 MG/1
150 CAPSULE ORAL 2 TIMES DAILY
Qty: 60 CAPSULE | Refills: 11 | Status: SHIPPED | OUTPATIENT
Start: 2023-01-26

## 2023-02-14 RX ORDER — LOSARTAN POTASSIUM 25 MG/1
TABLET ORAL
Qty: 30 TABLET | Refills: 3 | Status: SHIPPED | OUTPATIENT
Start: 2023-02-14

## 2023-02-14 RX ORDER — DILTIAZEM HYDROCHLORIDE 360 MG/1
CAPSULE, EXTENDED RELEASE ORAL
Qty: 30 CAPSULE | Refills: 3 | Status: SHIPPED | OUTPATIENT
Start: 2023-02-14 | End: 2023-03-15 | Stop reason: HOSPADM

## 2023-02-15 ENCOUNTER — OFFICE VISIT (OUTPATIENT)
Dept: CARDIOLOGY | Facility: CLINIC | Age: 68
End: 2023-02-15
Payer: MEDICARE

## 2023-02-15 VITALS
SYSTOLIC BLOOD PRESSURE: 128 MMHG | BODY MASS INDEX: 23.13 KG/M2 | DIASTOLIC BLOOD PRESSURE: 82 MMHG | HEIGHT: 75 IN | HEART RATE: 111 BPM | WEIGHT: 186 LBS

## 2023-02-15 DIAGNOSIS — R55 SYNCOPE, UNSPECIFIED SYNCOPE TYPE: ICD-10-CM

## 2023-02-15 DIAGNOSIS — I48.19 PERSISTENT ATRIAL FIBRILLATION: Primary | ICD-10-CM

## 2023-02-15 PROCEDURE — 93000 ELECTROCARDIOGRAM COMPLETE: CPT | Performed by: INTERNAL MEDICINE

## 2023-02-15 PROCEDURE — 99214 OFFICE O/P EST MOD 30 MIN: CPT | Performed by: INTERNAL MEDICINE

## 2023-02-15 NOTE — PROGRESS NOTES
Date of Office Visit: 02/15/2023  Encounter Provider: Jonathan Junior MD  Place of Service: [facility]ARDIOLOG[facility]  Patient Name: Emmanuel Huizar  : 1955    Subjective:     Encounter Date:02/15/2023      Patient ID: Emmanuel Huizar is a 67 y.o. male who has a cc of  Persistent AF and he had syncopal spell on Dec 12. He had a subdural. It was small.     They discovered AF  w RVR. He was not surprised. Both of his brothers had AF     Before the diagnosis he thinks he was confusing the symptoms of AF with long COVID     He never felt palp or tachycardia.     He had low power, fatigue was going since 2019.     He thinks the diltiazem.     There have been no hospital admission since the last visit.     There have been no bleeding events.       History reviewed. No pertinent past medical history.    Social History     Socioeconomic History   • Marital status:    Tobacco Use   • Smoking status: Never     Passive exposure: Never   • Smokeless tobacco: Never   • Tobacco comments:     Daily caffeine - soda   Vaping Use   • Vaping Use: Never used   Substance and Sexual Activity   • Alcohol use: Yes     Alcohol/week: 7.0 standard drinks     Types: 7 Shots of liquor per week     Comment: 7 drinks per week   • Drug use: Never   • Sexual activity: Defer       Family History   Problem Relation Age of Onset   • Heart failure Father 63        cabg   • Hypertension Father    • Atrial fibrillation Brother         cardiac ablation   • Atrial fibrillation Brother         cardiac ablationm       Review of Systems   Constitutional: Negative for fever and night sweats.   HENT: Negative for ear pain and stridor.    Eyes: Negative for discharge and visual halos.   Cardiovascular: Negative for cyanosis.   Respiratory: Negative for hemoptysis and sputum production.    Hematologic/Lymphatic: Negative for adenopathy.   Skin: Negative for nail changes and unusual hair distribution.   Musculoskeletal: Negative for  "gout and joint swelling.   Gastrointestinal: Negative for bowel incontinence and flatus.   Genitourinary: Negative for dysuria and flank pain.   Neurological: Negative for seizures and tremors.   Psychiatric/Behavioral: Negative for altered mental status. The patient is not nervous/anxious.             Objective:     Vitals:    02/15/23 1024   BP: 128/82   Pulse: 111   Weight: 84.4 kg (186 lb)   Height: 190.5 cm (75\")         Eyes:      General:         Right eye: No discharge.         Left eye: No discharge.   HENT:      Head: Normocephalic and atraumatic.   Neck:      Thyroid: No thyromegaly.      Vascular: No JVD.   Pulmonary:      Effort: Pulmonary effort is normal.      Breath sounds: Normal breath sounds. No rales.   Cardiovascular:      Normal rate. Irregularly irregular rhythm.      No gallop.   Edema:     Peripheral edema absent.   Abdominal:      General: Bowel sounds are normal.      Palpations: Abdomen is soft.      Tenderness: There is no abdominal tenderness.   Musculoskeletal: Normal range of motion.         General: No deformity. Skin:     General: Skin is warm and dry.      Findings: No erythema.   Neurological:      Mental Status: Alert and oriented to person, place, and time.      Motor: Normal muscle tone.   Psychiatric:         Behavior: Behavior normal.         Thought Content: Thought content normal.           ECG 12 Lead    Date/Time: 2/15/2023 11:02 AM  Performed by: Jonathan Junior MD  Authorized by: Jonathan Junior MD   Comparison: compared with previous ECG   Similar to previous ECG  Rhythm: atrial fibrillation            Lab Review:       Assessment:          Diagnosis Plan   1. Persistent atrial fibrillation (HCC)        2. Syncope, unspecified syncope type               Plan:     Pers AF -- big atria, positive FH and symptoms.     Rec sotalol plus CV then probably PVI     Long discussion -- this will be process.             "

## 2023-03-13 ENCOUNTER — HOSPITAL ENCOUNTER (INPATIENT)
Facility: HOSPITAL | Age: 68
LOS: 2 days | Discharge: HOME | DRG: 310 | End: 2023-03-15
Attending: INTERNAL MEDICINE | Admitting: INTERNAL MEDICINE
Payer: MEDICARE

## 2023-03-13 PROBLEM — I48.19 PERSISTENT ATRIAL FIBRILLATION: Status: ACTIVE | Noted: 2023-03-13

## 2023-03-13 LAB
ALBUMIN SERPL-MCNC: 4 G/DL (ref 3.5–5.2)
ALBUMIN/GLOB SERPL: 1.2 G/DL
ALP SERPL-CCNC: 63 U/L (ref 39–117)
ALT SERPL W P-5'-P-CCNC: 7 U/L (ref 1–41)
ANION GAP SERPL CALCULATED.3IONS-SCNC: 10 MMOL/L (ref 5–15)
AST SERPL-CCNC: 13 U/L (ref 1–40)
BASOPHILS # BLD AUTO: 0.12 10*3/MM3 (ref 0–0.2)
BASOPHILS NFR BLD AUTO: 1.4 % (ref 0–1.5)
BILIRUB SERPL-MCNC: 0.7 MG/DL (ref 0–1.2)
BUN SERPL-MCNC: 17 MG/DL (ref 8–23)
BUN/CREAT SERPL: 17 (ref 7–25)
CALCIUM SPEC-SCNC: 9 MG/DL (ref 8.6–10.5)
CHLORIDE SERPL-SCNC: 109 MMOL/L (ref 98–107)
CO2 SERPL-SCNC: 22 MMOL/L (ref 22–29)
CREAT SERPL-MCNC: 1 MG/DL (ref 0.76–1.27)
DEPRECATED RDW RBC AUTO: 53.3 FL (ref 37–54)
EGFRCR SERPLBLD CKD-EPI 2021: 83 ML/MIN/1.73
EOSINOPHIL # BLD AUTO: 0.31 10*3/MM3 (ref 0–0.4)
EOSINOPHIL NFR BLD AUTO: 3.7 % (ref 0.3–6.2)
ERYTHROCYTE [DISTWIDTH] IN BLOOD BY AUTOMATED COUNT: 14.5 % (ref 12.3–15.4)
GLOBULIN UR ELPH-MCNC: 3.4 GM/DL
GLUCOSE SERPL-MCNC: 107 MG/DL (ref 65–99)
HCT VFR BLD AUTO: 41.4 % (ref 37.5–51)
HGB BLD-MCNC: 14.4 G/DL (ref 13–17.7)
IMM GRANULOCYTES # BLD AUTO: 0.03 10*3/MM3 (ref 0–0.05)
IMM GRANULOCYTES NFR BLD AUTO: 0.4 % (ref 0–0.5)
LYMPHOCYTES # BLD AUTO: 1.2 10*3/MM3 (ref 0.7–3.1)
LYMPHOCYTES NFR BLD AUTO: 14.1 % (ref 19.6–45.3)
MAGNESIUM SERPL-MCNC: 2.2 MG/DL (ref 1.6–2.4)
MCH RBC QN AUTO: 35.4 PG (ref 26.6–33)
MCHC RBC AUTO-ENTMCNC: 34.8 G/DL (ref 31.5–35.7)
MCV RBC AUTO: 101.7 FL (ref 79–97)
MONOCYTES # BLD AUTO: 0.84 10*3/MM3 (ref 0.1–0.9)
MONOCYTES NFR BLD AUTO: 9.9 % (ref 5–12)
NEUTROPHILS NFR BLD AUTO: 5.99 10*3/MM3 (ref 1.7–7)
NEUTROPHILS NFR BLD AUTO: 70.5 % (ref 42.7–76)
NRBC BLD AUTO-RTO: 0 /100 WBC (ref 0–0.2)
PLATELET # BLD AUTO: 314 10*3/MM3 (ref 140–450)
PMV BLD AUTO: 10.1 FL (ref 6–12)
POTASSIUM SERPL-SCNC: 3.9 MMOL/L (ref 3.5–5.2)
PROT SERPL-MCNC: 7.4 G/DL (ref 6–8.5)
QT INTERVAL: 320 MS
QT INTERVAL: 387 MS
RBC # BLD AUTO: 4.07 10*6/MM3 (ref 4.14–5.8)
SODIUM SERPL-SCNC: 141 MMOL/L (ref 136–145)
WBC NRBC COR # BLD: 8.49 10*3/MM3 (ref 3.4–10.8)

## 2023-03-13 PROCEDURE — 93005 ELECTROCARDIOGRAM TRACING: CPT | Performed by: INTERNAL MEDICINE

## 2023-03-13 PROCEDURE — 93010 ELECTROCARDIOGRAM REPORT: CPT | Performed by: INTERNAL MEDICINE

## 2023-03-13 PROCEDURE — 85025 COMPLETE CBC W/AUTO DIFF WBC: CPT | Performed by: NURSE PRACTITIONER

## 2023-03-13 PROCEDURE — 80053 COMPREHEN METABOLIC PANEL: CPT | Performed by: NURSE PRACTITIONER

## 2023-03-13 PROCEDURE — 83735 ASSAY OF MAGNESIUM: CPT | Performed by: NURSE PRACTITIONER

## 2023-03-13 PROCEDURE — 93005 ELECTROCARDIOGRAM TRACING: CPT | Performed by: NURSE PRACTITIONER

## 2023-03-13 RX ORDER — MAGNESIUM SULFATE HEPTAHYDRATE 40 MG/ML
2 INJECTION, SOLUTION INTRAVENOUS AS NEEDED
Status: DISCONTINUED | OUTPATIENT
Start: 2023-03-13 | End: 2023-03-15 | Stop reason: HOSPADM

## 2023-03-13 RX ORDER — POTASSIUM CHLORIDE 750 MG/1
40 TABLET, FILM COATED, EXTENDED RELEASE ORAL AS NEEDED
Status: DISCONTINUED | OUTPATIENT
Start: 2023-03-13 | End: 2023-03-15 | Stop reason: HOSPADM

## 2023-03-13 RX ORDER — NITROGLYCERIN 0.4 MG/1
0.4 TABLET SUBLINGUAL
Status: DISCONTINUED | OUTPATIENT
Start: 2023-03-13 | End: 2023-03-15 | Stop reason: HOSPADM

## 2023-03-13 RX ORDER — DABIGATRAN ETEXILATE 150 MG/1
150 CAPSULE ORAL 2 TIMES DAILY
Status: DISCONTINUED | OUTPATIENT
Start: 2023-03-13 | End: 2023-03-15 | Stop reason: HOSPADM

## 2023-03-13 RX ORDER — POTASSIUM CHLORIDE 1.5 G/1.77G
40 POWDER, FOR SOLUTION ORAL AS NEEDED
Status: DISCONTINUED | OUTPATIENT
Start: 2023-03-13 | End: 2023-03-15 | Stop reason: HOSPADM

## 2023-03-13 RX ORDER — LOSARTAN POTASSIUM 25 MG/1
25 TABLET ORAL DAILY
Status: DISCONTINUED | OUTPATIENT
Start: 2023-03-13 | End: 2023-03-15 | Stop reason: HOSPADM

## 2023-03-13 RX ORDER — MAGNESIUM SULFATE HEPTAHYDRATE 40 MG/ML
4 INJECTION, SOLUTION INTRAVENOUS AS NEEDED
Status: DISCONTINUED | OUTPATIENT
Start: 2023-03-13 | End: 2023-03-15 | Stop reason: HOSPADM

## 2023-03-13 RX ORDER — SOTALOL HYDROCHLORIDE 80 MG/1
160 TABLET ORAL EVERY 12 HOURS
Status: DISCONTINUED | OUTPATIENT
Start: 2023-03-13 | End: 2023-03-15 | Stop reason: HOSPADM

## 2023-03-13 RX ADMIN — LOSARTAN POTASSIUM 25 MG: 25 TABLET, FILM COATED ORAL at 14:45

## 2023-03-13 RX ADMIN — DABIGATRAN ETEXILATE MESYLATE 150 MG: 150 CAPSULE ORAL at 14:42

## 2023-03-13 RX ADMIN — SOTALOL HYDROCHLORIDE 160 MG: 80 TABLET ORAL at 14:42

## 2023-03-13 NOTE — PLAN OF CARE
Problem: Adult Inpatient Plan of Care  Goal: Plan of Care Review  3/13/2023 1830 by Luis Miguel Galarza RN  Outcome: Ongoing, Progressing  3/13/2023 1806 by Luis Miguel Galarza RN  Outcome: Ongoing, Progressing  Goal: Patient-Specific Goal (Individualized)  3/13/2023 1830 by Luis Miguel Galarza RN  Outcome: Ongoing, Progressing  3/13/2023 1806 by Luis Miguel Galarza RN  Outcome: Ongoing, Progressing  Goal: Absence of Hospital-Acquired Illness or Injury  3/13/2023 1830 by Luis Miguel Galarza RN  Outcome: Ongoing, Progressing  3/13/2023 1806 by Luis Miguel Galarza RN  Outcome: Ongoing, Progressing  Intervention: Identify and Manage Fall Risk  Recent Flowsheet Documentation  Taken 3/13/2023 1820 by Luis Miguel Galarza RN  Safety Promotion/Fall Prevention: safety round/check completed  Taken 3/13/2023 1630 by Luis Miguel Galarza RN  Safety Promotion/Fall Prevention: safety round/check completed  Taken 3/13/2023 1442 by Luis Miguel Galarza RN  Safety Promotion/Fall Prevention: safety round/check completed  Taken 3/13/2023 1153 by Luis Miguel Galarza RN  Safety Promotion/Fall Prevention:   activity supervised   assistive device/personal items within reach   clutter free environment maintained   fall prevention program maintained   lighting adjusted   nonskid shoes/slippers when out of bed   room organization consistent   safety round/check completed  Intervention: Prevent Skin Injury  Recent Flowsheet Documentation  Taken 3/13/2023 1153 by Luis Miguel Galarza RN  Body Position: supine  Taken 3/13/2023 1149 by Luis Miguel Galarza RN  Body Position: supine  Intervention: Prevent and Manage VTE (Venous Thromboembolism) Risk  Recent Flowsheet Documentation  Taken 3/13/2023 1153 by Luis Miguel Galarza RN  Activity Management: activity adjusted per tolerance  Taken 3/13/2023 1149 by Luis Miguel Galarza RN  Activity Management: activity adjusted per tolerance  Goal: Optimal Comfort and Wellbeing  3/13/2023 1830 by Luis Miguel Galarza RN  Outcome: Ongoing,  Progressing  3/13/2023 1806 by Luis Miguel Galarza RN  Outcome: Ongoing, Progressing  Goal: Readiness for Transition of Care  3/13/2023 1830 by Luis Miguel Galarza RN  Outcome: Ongoing, Progressing  3/13/2023 1806 by Luis Miguel Galarza RN  Outcome: Ongoing, Progressing  Intervention: Mutually Develop Transition Plan  Recent Flowsheet Documentation  Taken 3/13/2023 1553 by Luis Miguel Galarza RN  Equipment Currently Used at Home: none  Goal: Plan of Care Review  3/13/2023 1830 by Luis Miguel Galarza RN  Outcome: Ongoing, Progressing  3/13/2023 1806 by Luis Miguel Galarza RN  Outcome: Ongoing, Progressing  Goal: Patient-Specific Goal (Individualized)  3/13/2023 1830 by Luis Miguel Galarza RN  Outcome: Ongoing, Progressing  3/13/2023 1806 by Luis Miguel Galarza RN  Outcome: Ongoing, Progressing  Goal: Absence of Hospital-Acquired Illness or Injury  3/13/2023 1830 by Luis Miguel Galarza RN  Outcome: Ongoing, Progressing  3/13/2023 1806 by Luis Miguel Galarza RN  Outcome: Ongoing, Progressing  Intervention: Identify and Manage Fall Risk  Recent Flowsheet Documentation  Taken 3/13/2023 1820 by Luis Miguel Galarza RN  Safety Promotion/Fall Prevention: safety round/check completed  Taken 3/13/2023 1630 by Luis Miguel Galarza RN  Safety Promotion/Fall Prevention: safety round/check completed  Taken 3/13/2023 1442 by Luis Miguel Galarza RN  Safety Promotion/Fall Prevention: safety round/check completed  Taken 3/13/2023 1153 by Luis Miguel Galarza RN  Safety Promotion/Fall Prevention:   activity supervised   assistive device/personal items within reach   clutter free environment maintained   fall prevention program maintained   lighting adjusted   nonskid shoes/slippers when out of bed   room organization consistent   safety round/check completed  Intervention: Prevent Skin Injury  Recent Flowsheet Documentation  Taken 3/13/2023 1153 by Luis Miguel Galarza RN  Body Position: supine  Taken 3/13/2023 1149 by Luis Miguel Galarza RN  Body Position:  supine  Intervention: Prevent and Manage VTE (Venous Thromboembolism) Risk  Recent Flowsheet Documentation  Taken 3/13/2023 1153 by Luis Miguel Galarza, RN  Activity Management: activity adjusted per tolerance  Taken 3/13/2023 1149 by Luis Miguel Galarza, RN  Activity Management: activity adjusted per tolerance  Goal: Optimal Comfort and Wellbeing  3/13/2023 1830 by Luis Miguel Galarza, RN  Outcome: Ongoing, Progressing  3/13/2023 1806 by Luis Miguel Galarza, VITOR  Outcome: Ongoing, Progressing  Goal: Readiness for Transition of Care  3/13/2023 1830 by Luis Miguel Galarza, RN  Outcome: Ongoing, Progressing  3/13/2023 1806 by Luis Miguel Galarza, RN  Outcome: Ongoing, Progressing  Intervention: Mutually Develop Transition Plan  Recent Flowsheet Documentation  Taken 3/13/2023 1553 by Luis Miguel Galarza, RN  Equipment Currently Used at Home: none   Goal Outcome Evaluation:

## 2023-03-13 NOTE — PLAN OF CARE
Goal Outcome Evaluation:      Direct admit to [facility]. Pt is afib with HR from 90s to 100s, vitals otherwise stable on room air. Sotalol started, QTC < 450. Post-dose EKG pending. No c/o pain or discomfort. Standby assist d/t hx of syncope, but pt steady on feet when ambulation with no c/o dizziness or weakness. Cardioversion in AM. Will continue plan of care.

## 2023-03-13 NOTE — PROGRESS NOTES
[facility] Clinical Pharmacy Services: Sotalol Consult    Pharmacy has been consulted to look over Emmanuel Huizar's profile to review renal function and drug interactions before starting sotalol inpatient per Dr. Junior's request.    Relevant clinical data and objective history reviewed:  67 y.o. male        No past medical history on file.  Creatinine   Date Value Ref Range Status   03/13/2023 1.00 0.76 - 1.27 mg/dL Final     BUN   Date Value Ref Range Status   03/13/2023 17 8 - 23 mg/dL Final     Estimated Creatinine Clearance: 86.7 mL/min (by C-G formula based on SCr of 1 mg/dL).    Assessment/Plan    1. Based on patient's renal function, dosing guidelines recommends a starting dose of sotalol  mg PO q12h. This dose may change after initial dosing depending on the EKG, or if the physician feels there is a reason for an alternative dose.    2. Looking over the patient's home medication list, the following medications interact with sotalol:    · Diltiazem - worsening bradycardia/hypotesnion  · Losartan - hypotension    Neither of these preclude initiation of sotalol.     3. I have advised the patient to separate anything with magnesium, phosphate, or calcium from their sotalol dose by at least an hour.      4. I have also advised the patient on contraindications and potential drug interactions associated with sotalol, including antibiotics and antiemetics.     Thank you for allowing me to participate in your patient's care. Please call pharmacy with any questions or concerns.  Nayan Sharp [facility]

## 2023-03-14 ENCOUNTER — HOSPITAL ENCOUNTER (OUTPATIENT)
Dept: CARDIOLOGY | Facility: HOSPITAL | Age: 68
Discharge: HOME | DRG: 310 | End: 2023-03-14
Admitting: INTERNAL MEDICINE
Payer: MEDICARE

## 2023-03-14 VITALS
RESPIRATION RATE: 24 BRPM | DIASTOLIC BLOOD PRESSURE: 89 MMHG | SYSTOLIC BLOOD PRESSURE: 128 MMHG | OXYGEN SATURATION: 98 % | HEART RATE: 79 BPM

## 2023-03-14 DIAGNOSIS — R55 SYNCOPE, UNSPECIFIED SYNCOPE TYPE: ICD-10-CM

## 2023-03-14 DIAGNOSIS — I48.19 PERSISTENT ATRIAL FIBRILLATION: ICD-10-CM

## 2023-03-14 LAB
ANION GAP SERPL CALCULATED.3IONS-SCNC: 12.3 MMOL/L (ref 5–15)
BUN SERPL-MCNC: 18 MG/DL (ref 8–23)
BUN/CREAT SERPL: 20.9 (ref 7–25)
CALCIUM SPEC-SCNC: 9.2 MG/DL (ref 8.6–10.5)
CHLORIDE SERPL-SCNC: 105 MMOL/L (ref 98–107)
CO2 SERPL-SCNC: 21.7 MMOL/L (ref 22–29)
CREAT SERPL-MCNC: 0.86 MG/DL (ref 0.76–1.27)
EGFRCR SERPLBLD CKD-EPI 2021: 95.5 ML/MIN/1.73
GLUCOSE SERPL-MCNC: 91 MG/DL (ref 65–99)
MAGNESIUM SERPL-MCNC: 2.4 MG/DL (ref 1.6–2.4)
MAXIMAL PREDICTED HEART RATE: 154 BPM
POTASSIUM SERPL-SCNC: 4.2 MMOL/L (ref 3.5–5.2)
QT INTERVAL: 371 MS
QT INTERVAL: 386 MS
QT INTERVAL: 405 MS
SODIUM SERPL-SCNC: 139 MMOL/L (ref 136–145)
STRESS TARGET HR: 131 BPM

## 2023-03-14 PROCEDURE — 93005 ELECTROCARDIOGRAM TRACING: CPT | Performed by: NURSE PRACTITIONER

## 2023-03-14 PROCEDURE — 93010 ELECTROCARDIOGRAM REPORT: CPT | Performed by: INTERNAL MEDICINE

## 2023-03-14 PROCEDURE — 92960 CARDIOVERSION ELECTRIC EXT: CPT

## 2023-03-14 PROCEDURE — 5A2204Z RESTORATION OF CARDIAC RHYTHM, SINGLE: ICD-10-PCS | Performed by: INTERNAL MEDICINE

## 2023-03-14 PROCEDURE — 83735 ASSAY OF MAGNESIUM: CPT | Performed by: NURSE PRACTITIONER

## 2023-03-14 PROCEDURE — 80048 BASIC METABOLIC PNL TOTAL CA: CPT | Performed by: NURSE PRACTITIONER

## 2023-03-14 PROCEDURE — 92960 CARDIOVERSION ELECTRIC EXT: CPT | Performed by: INTERNAL MEDICINE

## 2023-03-14 PROCEDURE — 93005 ELECTROCARDIOGRAM TRACING: CPT | Performed by: INTERNAL MEDICINE

## 2023-03-14 RX ADMIN — DABIGATRAN ETEXILATE MESYLATE 150 MG: 150 CAPSULE ORAL at 08:33

## 2023-03-14 RX ADMIN — SOTALOL HYDROCHLORIDE 160 MG: 80 TABLET ORAL at 02:41

## 2023-03-14 RX ADMIN — Medication 60 MG: at 11:44

## 2023-03-14 RX ADMIN — LOSARTAN POTASSIUM 25 MG: 25 TABLET, FILM COATED ORAL at 08:32

## 2023-03-14 RX ADMIN — SOTALOL HYDROCHLORIDE 160 MG: 80 TABLET ORAL at 14:35

## 2023-03-14 RX ADMIN — DABIGATRAN ETEXILATE MESYLATE 150 MG: 150 CAPSULE ORAL at 20:21

## 2023-03-14 NOTE — CASE MANAGEMENT/SOCIAL WORK
Discharge Planning Assessment  [facility]     Patient Name: Emmanuel Huizar  MRN: [id number]  Today's Date: 3/14/2023    Admit Date: 3/13/2023    Plan: Home with spouse; follow for cost of Sotalol   Discharge Needs Assessment     Row Name 03/14/23 1544       Living Environment    People in Home spouse    Current Living Arrangements home    Potentially Unsafe Housing Conditions none    Primary Care Provided by self    Provides Primary Care For no one    Family Caregiver if Needed spouse    Quality of Family Relationships involved;helpful;supportive    Able to Return to Prior Arrangements yes       Resource/Environmental Concerns    Resource/Environmental Concerns none       Transition Planning    Patient/Family Anticipates Transition to home with family    Patient/Family Anticipated Services at Transition none    Transportation Anticipated family or friend will provide       Discharge Needs Assessment    Readmission Within the Last 30 Days no previous admission in last 30 days    Equipment Currently Used at Home none    Concerns to be Addressed no discharge needs identified;denies needs/concerns at this time    Anticipated Changes Related to Illness none    Equipment Needed After Discharge none               Discharge Plan     Row Name 03/14/23 1544       Plan    Plan Home with spouse; follow for cost of Sotalol    Plan Comments CCP met with patient at bedside. CCP role explained and discharge planning discussed. Face sheet verified and IMM noted. Patient does not have a PCP and declined information on PCP. Patient reports his wife’s MD is giving him a recommendation. Patient lives with his wife and is independent with his ADLs. Patient does not use DME. Patient has not used home health or been to SNF. Patient states he does not have medication drug coverage (by his choice and does not want it) and usually shops around for lowest cost of medications at pharmacies and utilizes [facility]. Patient agreeable to meds to  beds. Patient reports he just received a new script for PRadaxa and does not need that one filled. CCP will follow for cost of Sotalol and discuss cost with patient. Amie MAYA              Continued Care and Services - Admitted Since 3/13/2023    Coordination has not been started for this encounter.       Expected Discharge Date and Time     Expected Discharge Date Expected Discharge Time    Mar 16, 2023          Demographic Summary     Row Name 03/14/23 1543       General Information    Admission Type inpatient    Arrived From emergency department    Required Notices Provided Important Message from Medicare    Referral Source admission list    Reason for Consult discharge planning    Preferred Language English               Functional Status     Row Name 03/14/23 1544       Functional Status    Usual Activity Tolerance good    Current Activity Tolerance good       Functional Status, IADL    Medications independent    Meal Preparation independent    Housekeeping independent    Laundry independent    Shopping independent       Mental Status    General Appearance WDL WDL       Mental Status Summary    Recent Changes in Mental Status/Cognitive Functioning no changes               Psychosocial    No documentation.                Abuse/Neglect    No documentation.                Legal    No documentation.                Substance Abuse    No documentation.                Patient Forms    No documentation.                   FRANCESCO Rodriguez

## 2023-03-14 NOTE — PLAN OF CARE
Goal Outcome Evaluation:  Plan of Care Reviewed With: patient        Progress: improving   S/p cardioversion. VSS Hr 77 NSR with PAC's. Pt denies any chest pain or shortness of air. Will continue to monitor qtc over night

## 2023-03-14 NOTE — PLAN OF CARE
Goal Outcome Evaluation:  Plan of Care Reviewed With: patient        Progress: no change  Outcome Evaluation: Patient is AOx4. All vs stable. Still Afib on tele 70-90's and On RA. No c/o pain overnight. Npo for Cardioversion today. Will continue to monitor and adjust as needed.  Sotalol  given per order. Qtc< 500    Problem: Adult Inpatient Plan of Care  Goal: Plan of Care Review  Outcome: Ongoing, Progressing  Flowsheets (Taken 3/14/2023 0542)  Progress: no change  Plan of Care Reviewed With: patient  Outcome Evaluation: Patient is AOx4. All vs stable. Still Afib on tele 70-90's and On RA. No c/o pain overnight. Npo for Cardioversion today. Will continue to monitor and adjust as needed.  Goal: Patient-Specific Goal (Individualized)  Outcome: Ongoing, Progressing  Goal: Absence of Hospital-Acquired Illness or Injury  Outcome: Ongoing, Progressing  Intervention: Identify and Manage Fall Risk  Description: Perform standard risk assessment on admission using a validated tool or comprehensive approach appropriate to the patient; reassess fall risk frequently, with change in status or transfer to another level of care.  Communicate fall injury risk to interprofessional healthcare team.  Determine need for increased observation, equipment and environmental modification, such as low bed, signage and supportive, nonskid footwear.  Adjust safety measures to individual developmental age, stage and identified risk factors.  Reinforce the importance of safety and physical activity with patient and family.  Perform regular intentional rounding to assess need for position change, pain assessment and personal needs, including assistance with toileting.  Recent Flowsheet Documentation  Taken 3/14/2023 4027 by Tony Villalba RN  Safety Promotion/Fall Prevention:  • activity supervised  • assistive device/personal items within reach  • fall prevention program maintained  • clutter free environment maintained  • nonskid shoes/slippers  when out of bed  • muscle strengthening facilitated  • safety round/check completed  • room organization consistent  Taken 3/14/2023 0200 by Tony Villalba RN  Safety Promotion/Fall Prevention:  • assistive device/personal items within reach  • activity supervised  • lighting adjusted  • muscle strengthening facilitated  • mobility aid in reach  • room organization consistent  • safety round/check completed  Taken 3/14/2023 0015 by Tony Villalba RN  Safety Promotion/Fall Prevention:  • activity supervised  • assistive device/personal items within reach  • fall prevention program maintained  • clutter free environment maintained  • muscle strengthening facilitated  • nonskid shoes/slippers when out of bed  • safety round/check completed  • room organization consistent  Taken 3/13/2023 2236 by Tony Villalba RN  Safety Promotion/Fall Prevention:  • clutter free environment maintained  • activity supervised  • assistive device/personal items within reach  • mobility aid in reach  • lighting adjusted  • safety round/check completed  • fall prevention program maintained  Taken 3/13/2023 2045 by Tony Villalba RN  Safety Promotion/Fall Prevention:  • assistive device/personal items within reach  • activity supervised  • mobility aid in reach  • lighting adjusted  • room organization consistent  • safety round/check completed  Intervention: Prevent Skin Injury  Description: Perform a screening for skin injury risk, such as pressure or moisture associated skin damage on admission and at regular intervals throughout hospital stay.  Keep all areas of skin (especially folds) clean and dry.  Maintain adequate skin hydration.  Relieve and redistribute pressure and protect bony prominences; implement measures based on patient-specific risk factors.  Match turning and repositioning schedule to clinical condition.  Encourage weight shift frequently; assist with reposition if unable to complete independently.  Float heels off bed;  avoid pressure on the Achilles tendon.  Keep skin free from extended contact with medical devices.  Encourage functional activity and mobility, as early as tolerated.  Use aids (e.g., slide boards, mechanical lift) during transfer.  Recent Flowsheet Documentation  Taken 3/14/2023 0410 by Tony Villalba RN  Body Position: position changed independently  Taken 3/14/2023 0200 by Tony Villalba RN  Body Position:  • position changed independently  • supine, legs elevated  Taken 3/14/2023 0015 by Tony Villalba RN  Body Position: position changed independently  Taken 3/13/2023 2236 by Tony Villalba RN  Body Position: position changed independently  Taken 3/13/2023 2045 by Tony Villalba RN  Body Position: position changed independently  Intervention: Prevent and Manage VTE (Venous Thromboembolism) Risk  Description: Assess for VTE (venous thromboembolism) risk.  Encourage and assist with early ambulation.  Initiate and maintain compression or other therapy, as indicated, based on identified risk in accordance with organizational protocol and provider order.  Encourage both active and passive leg exercises while in bed, if unable to ambulate.  Recent Flowsheet Documentation  Taken 3/14/2023 0410 by Tony Villalba RN  Activity Management:  • activity encouraged  • activity adjusted per tolerance  Taken 3/14/2023 0200 by Tony Villalba RN  Activity Management:  • activity encouraged  • activity adjusted per tolerance  Taken 3/14/2023 0015 by Tony Villalba RN  Activity Management:  • activity adjusted per tolerance  • activity encouraged  VTE Prevention/Management:  • bilateral  • compression stockings off  Taken 3/13/2023 2236 by Tony Villalba RN  Activity Management:  • activity adjusted per tolerance  • activity encouraged  Taken 3/13/2023 2045 by Tony Villalba RN  Activity Management:  • activity adjusted per tolerance  • activity encouraged  VTE Prevention/Management:  • bilateral  • compression stockings off  Intervention:  Prevent Infection  Description: Maintain skin and mucous membrane integrity; promote hand, oral and pulmonary hygiene.  Optimize fluid balance, nutrition, sleep and glycemic control to maximize infection resistance.  Identify potential sources of infection early to prevent or mitigate progression of infection (e.g., wound, lines, devices).  Evaluate ongoing need for invasive devices; remove promptly when no longer indicated.  Recent Flowsheet Documentation  Taken 3/14/2023 0410 by Tony Villalba RN  Infection Prevention:  • visitors restricted/screened  • single patient room provided  • rest/sleep promoted  • personal protective equipment utilized  • hand hygiene promoted  • equipment surfaces disinfected  • environmental surveillance performed  • cohorting utilized  Taken 3/14/2023 0200 by Tony Villalba RN  Infection Prevention:  • visitors restricted/screened  • single patient room provided  • rest/sleep promoted  • personal protective equipment utilized  • hand hygiene promoted  • equipment surfaces disinfected  • environmental surveillance performed  • cohorting utilized  Taken 3/14/2023 0015 by Tony Villalba RN  Infection Prevention:  • single patient room provided  • visitors restricted/screened  • personal protective equipment utilized  • rest/sleep promoted  • hand hygiene promoted  • equipment surfaces disinfected  • environmental surveillance performed  • cohorting utilized  Taken 3/13/2023 2236 by Tony Villalba RN  Infection Prevention:  • visitors restricted/screened  • single patient room provided  • rest/sleep promoted  • hand hygiene promoted  • equipment surfaces disinfected  • environmental surveillance performed  • cohorting utilized  • personal protective equipment utilized  Taken 3/13/2023 2045 by Tony Villalba RN  Infection Prevention:  • visitors restricted/screened  • rest/sleep promoted  • personal protective equipment utilized  • hand hygiene promoted  • equipment surfaces disinfected  •  environmental surveillance performed  • cohorting utilized  • single patient room provided  Goal: Optimal Comfort and Wellbeing  Outcome: Ongoing, Progressing  Intervention: Provide Person-Centered Care  Description: Use a family-focused approach to care.  Develop trust and rapport by proactively providing information, encouraging questions, addressing concerns and offering reassurance.  Acknowledge emotional response to hospitalization.  Recognize and utilize personal coping strategies.  Honor spiritual and cultural preferences.  Recent Flowsheet Documentation  Taken 3/14/2023 0410 by Tony Villalba RN  Trust Relationship/Rapport:  • thoughts/feelings acknowledged  • reassurance provided  • questions encouraged  • questions answered  • empathic listening provided  • emotional support provided  • care explained  • choices provided  Taken 3/14/2023 0015 by Tony Villalba RN  Trust Relationship/Rapport:  • thoughts/feelings acknowledged  • reassurance provided  • questions encouraged  • questions answered  • empathic listening provided  • emotional support provided  • choices provided  • care explained  Taken 3/13/2023 2045 by Tony Villalba RN  Trust Relationship/Rapport:  • thoughts/feelings acknowledged  • reassurance provided  • questions encouraged  • questions answered  • empathic listening provided  • emotional support provided  • choices provided  • care explained  Goal: Readiness for Transition of Care  Outcome: Ongoing, Progressing  Goal: Plan of Care Review  Outcome: Ongoing, Progressing  Flowsheets (Taken 3/14/2023 0542)  Progress: no change  Plan of Care Reviewed With: patient  Outcome Evaluation: Patient is AOx4. All vs stable. Still Afib on tele 70-90's and On RA. No c/o pain overnight. Npo for Cardioversion today. Will continue to monitor and adjust as needed.  Goal: Patient-Specific Goal (Individualized)  Outcome: Ongoing, Progressing  Goal: Absence of Hospital-Acquired Illness or Injury  Outcome:  Ongoing, Progressing  Intervention: Identify and Manage Fall Risk  Description: Perform standard risk assessment on admission using a validated tool or comprehensive approach appropriate to the patient; reassess fall risk frequently, with change in status or transfer to another level of care.  Communicate fall injury risk to interprofessional healthcare team.  Determine need for increased observation, equipment and environmental modification, such as low bed, signage and supportive, nonskid footwear.  Adjust safety measures to individual developmental age, stage and identified risk factors.  Reinforce the importance of safety and physical activity with patient and family.  Perform regular intentional rounding to assess need for position change, pain assessment and personal needs, including assistance with toileting.  Recent Flowsheet Documentation  Taken 3/14/2023 0410 by Tony Villalba RN  Safety Promotion/Fall Prevention:  • activity supervised  • assistive device/personal items within reach  • fall prevention program maintained  • clutter free environment maintained  • nonskid shoes/slippers when out of bed  • muscle strengthening facilitated  • safety round/check completed  • room organization consistent  Taken 3/14/2023 0200 by Tony Villalba RN  Safety Promotion/Fall Prevention:  • assistive device/personal items within reach  • activity supervised  • lighting adjusted  • muscle strengthening facilitated  • mobility aid in reach  • room organization consistent  • safety round/check completed  Taken 3/14/2023 0015 by Tony Villalba, RN  Safety Promotion/Fall Prevention:  • activity supervised  • assistive device/personal items within reach  • fall prevention program maintained  • clutter free environment maintained  • muscle strengthening facilitated  • nonskid shoes/slippers when out of bed  • safety round/check completed  • room organization consistent  Taken 3/13/2023 2236 by Tony Villalba, RN  Safety  Promotion/Fall Prevention:  • clutter free environment maintained  • activity supervised  • assistive device/personal items within reach  • mobility aid in reach  • lighting adjusted  • safety round/check completed  • fall prevention program maintained  Taken 3/13/2023 2045 by Tony Villalba RN  Safety Promotion/Fall Prevention:  • assistive device/personal items within reach  • activity supervised  • mobility aid in reach  • lighting adjusted  • room organization consistent  • safety round/check completed  Intervention: Prevent Skin Injury  Description: Perform a screening for skin injury risk, such as pressure or moisture associated skin damage on admission and at regular intervals throughout hospital stay.  Keep all areas of skin (especially folds) clean and dry.  Maintain adequate skin hydration.  Relieve and redistribute pressure and protect bony prominences; implement measures based on patient-specific risk factors.  Match turning and repositioning schedule to clinical condition.  Encourage weight shift frequently; assist with reposition if unable to complete independently.  Float heels off bed; avoid pressure on the Achilles tendon.  Keep skin free from extended contact with medical devices.  Encourage functional activity and mobility, as early as tolerated.  Use aids (e.g., slide boards, mechanical lift) during transfer.  Recent Flowsheet Documentation  Taken 3/14/2023 0410 by Tony Villalba RN  Body Position: position changed independently  Taken 3/14/2023 0200 by Tony Villalba RN  Body Position:  • position changed independently  • supine, legs elevated  Taken 3/14/2023 0015 by Tony Villalba RN  Body Position: position changed independently  Taken 3/13/2023 2236 by Tony Villalba RN  Body Position: position changed independently  Taken 3/13/2023 2045 by Tony Villalba RN  Body Position: position changed independently  Intervention: Prevent and Manage VTE (Venous Thromboembolism) Risk  Description: Assess for  VTE (venous thromboembolism) risk.  Encourage and assist with early ambulation.  Initiate and maintain compression or other therapy, as indicated, based on identified risk in accordance with organizational protocol and provider order.  Encourage both active and passive leg exercises while in bed, if unable to ambulate.  Recent Flowsheet Documentation  Taken 3/14/2023 0410 by Tony Villalba RN  Activity Management:  • activity encouraged  • activity adjusted per tolerance  Taken 3/14/2023 0200 by Tony Villalba RN  Activity Management:  • activity encouraged  • activity adjusted per tolerance  Taken 3/14/2023 0015 by Tony Villalba RN  Activity Management:  • activity adjusted per tolerance  • activity encouraged  VTE Prevention/Management:  • bilateral  • compression stockings off  Taken 3/13/2023 2236 by Tony Villalba RN  Activity Management:  • activity adjusted per tolerance  • activity encouraged  Taken 3/13/2023 2045 by Tony Villalba RN  Activity Management:  • activity adjusted per tolerance  • activity encouraged  VTE Prevention/Management:  • bilateral  • compression stockings off  Intervention: Prevent Infection  Description: Maintain skin and mucous membrane integrity; promote hand, oral and pulmonary hygiene.  Optimize fluid balance, nutrition, sleep and glycemic control to maximize infection resistance.  Identify potential sources of infection early to prevent or mitigate progression of infection (e.g., wound, lines, devices).  Evaluate ongoing need for invasive devices; remove promptly when no longer indicated.  Recent Flowsheet Documentation  Taken 3/14/2023 0410 by Tony Villalba RN  Infection Prevention:  • visitors restricted/screened  • single patient room provided  • rest/sleep promoted  • personal protective equipment utilized  • hand hygiene promoted  • equipment surfaces disinfected  • environmental surveillance performed  • cohorting utilized  Taken 3/14/2023 0200 by Tony Villalba RN  Infection  Prevention:  • visitors restricted/screened  • single patient room provided  • rest/sleep promoted  • personal protective equipment utilized  • hand hygiene promoted  • equipment surfaces disinfected  • environmental surveillance performed  • cohorting utilized  Taken 3/14/2023 0015 by Tony Villalba RN  Infection Prevention:  • single patient room provided  • visitors restricted/screened  • personal protective equipment utilized  • rest/sleep promoted  • hand hygiene promoted  • equipment surfaces disinfected  • environmental surveillance performed  • cohorting utilized  Taken 3/13/2023 2236 by Tony Villalba RN  Infection Prevention:  • visitors restricted/screened  • single patient room provided  • rest/sleep promoted  • hand hygiene promoted  • equipment surfaces disinfected  • environmental surveillance performed  • cohorting utilized  • personal protective equipment utilized  Taken 3/13/2023 2045 by Tony Villalba RN  Infection Prevention:  • visitors restricted/screened  • rest/sleep promoted  • personal protective equipment utilized  • hand hygiene promoted  • equipment surfaces disinfected  • environmental surveillance performed  • cohorting utilized  • single patient room provided  Goal: Optimal Comfort and Wellbeing  Outcome: Ongoing, Progressing  Intervention: Provide Person-Centered Care  Description: Use a family-focused approach to care.  Develop trust and rapport by proactively providing information, encouraging questions, addressing concerns and offering reassurance.  Acknowledge emotional response to hospitalization.  Recognize and utilize personal coping strategies.  Honor spiritual and cultural preferences.  Recent Flowsheet Documentation  Taken 3/14/2023 0410 by Tony Villalba RN  Trust Relationship/Rapport:  • thoughts/feelings acknowledged  • reassurance provided  • questions encouraged  • questions answered  • empathic listening provided  • emotional support provided  • care explained  • choices  provided  Taken 3/14/2023 0015 by Tony Villalba RN  Trust Relationship/Rapport:  • thoughts/feelings acknowledged  • reassurance provided  • questions encouraged  • questions answered  • empathic listening provided  • emotional support provided  • choices provided  • care explained  Taken 3/13/2023 2045 by Toyn Villalba RN  Trust Relationship/Rapport:  • thoughts/feelings acknowledged  • reassurance provided  • questions encouraged  • questions answered  • empathic listening provided  • emotional support provided  • choices provided  • care explained  Goal: Readiness for Transition of Care  Outcome: Ongoing, Progressing  Goal: Plan of Care Review  Outcome: Ongoing, Progressing  Flowsheets (Taken 3/14/2023 0542)  Progress: no change  Plan of Care Reviewed With: patient  Outcome Evaluation: Patient is AOx4. All vs stable. Still Afib on tele 70-90's and On RA. No c/o pain overnight. Npo for Cardioversion today. Will continue to monitor and adjust as needed.  Goal: Patient-Specific Goal (Individualized)  Outcome: Ongoing, Progressing  Goal: Absence of Hospital-Acquired Illness or Injury  Outcome: Ongoing, Progressing  Intervention: Identify and Manage Fall Risk  Description: Perform standard risk assessment on admission using a validated tool or comprehensive approach appropriate to the patient; reassess fall risk frequently, with change in status or transfer to another level of care.  Communicate fall injury risk to interprofessional healthcare team.  Determine need for increased observation, equipment and environmental modification, such as low bed, signage and supportive, nonskid footwear.  Adjust safety measures to individual developmental age, stage and identified risk factors.  Reinforce the importance of safety and physical activity with patient and family.  Perform regular intentional rounding to assess need for position change, pain assessment and personal needs, including assistance with toileting.  Recent  Flowsheet Documentation  Taken 3/14/2023 0410 by Tony Villalba RN  Safety Promotion/Fall Prevention:  • activity supervised  • assistive device/personal items within reach  • fall prevention program maintained  • clutter free environment maintained  • nonskid shoes/slippers when out of bed  • muscle strengthening facilitated  • safety round/check completed  • room organization consistent  Taken 3/14/2023 0200 by Tony Villalba RN  Safety Promotion/Fall Prevention:  • assistive device/personal items within reach  • activity supervised  • lighting adjusted  • muscle strengthening facilitated  • mobility aid in reach  • room organization consistent  • safety round/check completed  Taken 3/14/2023 0015 by Tony Villalba RN  Safety Promotion/Fall Prevention:  • activity supervised  • assistive device/personal items within reach  • fall prevention program maintained  • clutter free environment maintained  • muscle strengthening facilitated  • nonskid shoes/slippers when out of bed  • safety round/check completed  • room organization consistent  Taken 3/13/2023 2236 by Tony Villalba RN  Safety Promotion/Fall Prevention:  • clutter free environment maintained  • activity supervised  • assistive device/personal items within reach  • mobility aid in reach  • lighting adjusted  • safety round/check completed  • fall prevention program maintained  Taken 3/13/2023 2045 by Tony Villalba RN  Safety Promotion/Fall Prevention:  • assistive device/personal items within reach  • activity supervised  • mobility aid in reach  • lighting adjusted  • room organization consistent  • safety round/check completed  Intervention: Prevent Skin Injury  Description: Perform a screening for skin injury risk, such as pressure or moisture associated skin damage on admission and at regular intervals throughout hospital stay.  Keep all areas of skin (especially folds) clean and dry.  Maintain adequate skin hydration.  Relieve and redistribute pressure  and protect bony prominences; implement measures based on patient-specific risk factors.  Match turning and repositioning schedule to clinical condition.  Encourage weight shift frequently; assist with reposition if unable to complete independently.  Float heels off bed; avoid pressure on the Achilles tendon.  Keep skin free from extended contact with medical devices.  Encourage functional activity and mobility, as early as tolerated.  Use aids (e.g., slide boards, mechanical lift) during transfer.  Recent Flowsheet Documentation  Taken 3/14/2023 0410 by Tony Villalba RN  Body Position: position changed independently  Taken 3/14/2023 0200 by Tony Villalba RN  Body Position:  • position changed independently  • supine, legs elevated  Taken 3/14/2023 0015 by Tony Villalba RN  Body Position: position changed independently  Taken 3/13/2023 2236 by Tony Villalba RN  Body Position: position changed independently  Taken 3/13/2023 2045 by Tony Villalba RN  Body Position: position changed independently  Intervention: Prevent and Manage VTE (Venous Thromboembolism) Risk  Description: Assess for VTE (venous thromboembolism) risk.  Encourage and assist with early ambulation.  Initiate and maintain compression or other therapy, as indicated, based on identified risk in accordance with organizational protocol and provider order.  Encourage both active and passive leg exercises while in bed, if unable to ambulate.  Recent Flowsheet Documentation  Taken 3/14/2023 0410 by Tony Villalba RN  Activity Management:  • activity encouraged  • activity adjusted per tolerance  Taken 3/14/2023 0200 by Tony Villalba RN  Activity Management:  • activity encouraged  • activity adjusted per tolerance  Taken 3/14/2023 0015 by Tony Villalba RN  Activity Management:  • activity adjusted per tolerance  • activity encouraged  VTE Prevention/Management:  • bilateral  • compression stockings off  Taken 3/13/2023 2236 by Tony Villalba RN  Activity  Management:  • activity adjusted per tolerance  • activity encouraged  Taken 3/13/2023 2045 by Tony Villalba RN  Activity Management:  • activity adjusted per tolerance  • activity encouraged  VTE Prevention/Management:  • bilateral  • compression stockings off  Intervention: Prevent Infection  Description: Maintain skin and mucous membrane integrity; promote hand, oral and pulmonary hygiene.  Optimize fluid balance, nutrition, sleep and glycemic control to maximize infection resistance.  Identify potential sources of infection early to prevent or mitigate progression of infection (e.g., wound, lines, devices).  Evaluate ongoing need for invasive devices; remove promptly when no longer indicated.  Recent Flowsheet Documentation  Taken 3/14/2023 0410 by Tony Villalba RN  Infection Prevention:  • visitors restricted/screened  • single patient room provided  • rest/sleep promoted  • personal protective equipment utilized  • hand hygiene promoted  • equipment surfaces disinfected  • environmental surveillance performed  • cohorting utilized  Taken 3/14/2023 0200 by Tony Villalba RN  Infection Prevention:  • visitors restricted/screened  • single patient room provided  • rest/sleep promoted  • personal protective equipment utilized  • hand hygiene promoted  • equipment surfaces disinfected  • environmental surveillance performed  • cohorting utilized  Taken 3/14/2023 0015 by Tony Villalba RN  Infection Prevention:  • single patient room provided  • visitors restricted/screened  • personal protective equipment utilized  • rest/sleep promoted  • hand hygiene promoted  • equipment surfaces disinfected  • environmental surveillance performed  • cohorting utilized  Taken 3/13/2023 2236 by Tony Villalba RN  Infection Prevention:  • visitors restricted/screened  • single patient room provided  • rest/sleep promoted  • hand hygiene promoted  • equipment surfaces disinfected  • environmental surveillance performed  • cohorting  utilized  • personal protective equipment utilized  Taken 3/13/2023 2045 by Tony Villalba RN  Infection Prevention:  • visitors restricted/screened  • rest/sleep promoted  • personal protective equipment utilized  • hand hygiene promoted  • equipment surfaces disinfected  • environmental surveillance performed  • cohorting utilized  • single patient room provided  Goal: Optimal Comfort and Wellbeing  Outcome: Ongoing, Progressing  Intervention: Provide Person-Centered Care  Description: Use a family-focused approach to care.  Develop trust and rapport by proactively providing information, encouraging questions, addressing concerns and offering reassurance.  Acknowledge emotional response to hospitalization.  Recognize and utilize personal coping strategies.  Honor spiritual and cultural preferences.  Recent Flowsheet Documentation  Taken 3/14/2023 0410 by Tony Villalba RN  Trust Relationship/Rapport:  • thoughts/feelings acknowledged  • reassurance provided  • questions encouraged  • questions answered  • empathic listening provided  • emotional support provided  • care explained  • choices provided  Taken 3/14/2023 0015 by Tony Villalba RN  Trust Relationship/Rapport:  • thoughts/feelings acknowledged  • reassurance provided  • questions encouraged  • questions answered  • empathic listening provided  • emotional support provided  • choices provided  • care explained  Taken 3/13/2023 2045 by Tony Villalba RN  Trust Relationship/Rapport:  • thoughts/feelings acknowledged  • reassurance provided  • questions encouraged  • questions answered  • empathic listening provided  • emotional support provided  • choices provided  • care explained  Goal: Readiness for Transition of Care  Outcome: Ongoing, Progressing

## 2023-03-15 VITALS
BODY MASS INDEX: 22.69 KG/M2 | RESPIRATION RATE: 17 BRPM | SYSTOLIC BLOOD PRESSURE: 117 MMHG | TEMPERATURE: 98.3 F | HEIGHT: 75 IN | WEIGHT: 182.5 LBS | OXYGEN SATURATION: 97 % | DIASTOLIC BLOOD PRESSURE: 72 MMHG | HEART RATE: 69 BPM

## 2023-03-15 LAB
ANION GAP SERPL CALCULATED.3IONS-SCNC: 10 MMOL/L (ref 5–15)
BUN SERPL-MCNC: 17 MG/DL (ref 8–23)
BUN/CREAT SERPL: 16 (ref 7–25)
CALCIUM SPEC-SCNC: 8.4 MG/DL (ref 8.6–10.5)
CHLORIDE SERPL-SCNC: 109 MMOL/L (ref 98–107)
CO2 SERPL-SCNC: 21 MMOL/L (ref 22–29)
CREAT SERPL-MCNC: 1.06 MG/DL (ref 0.76–1.27)
EGFRCR SERPLBLD CKD-EPI 2021: 77.4 ML/MIN/1.73
GLUCOSE SERPL-MCNC: 89 MG/DL (ref 65–99)
MAGNESIUM SERPL-MCNC: 2.4 MG/DL (ref 1.6–2.4)
POTASSIUM SERPL-SCNC: 4.1 MMOL/L (ref 3.5–5.2)
QT INTERVAL: 387 MS
QT INTERVAL: 428 MS
SODIUM SERPL-SCNC: 140 MMOL/L (ref 136–145)

## 2023-03-15 PROCEDURE — 83735 ASSAY OF MAGNESIUM: CPT | Performed by: NURSE PRACTITIONER

## 2023-03-15 PROCEDURE — 99238 HOSP IP/OBS DSCHRG MGMT 30/<: CPT

## 2023-03-15 PROCEDURE — 93005 ELECTROCARDIOGRAM TRACING: CPT | Performed by: INTERNAL MEDICINE

## 2023-03-15 PROCEDURE — 80048 BASIC METABOLIC PNL TOTAL CA: CPT | Performed by: NURSE PRACTITIONER

## 2023-03-15 PROCEDURE — 93010 ELECTROCARDIOGRAM REPORT: CPT | Performed by: INTERNAL MEDICINE

## 2023-03-15 RX ORDER — SOTALOL HYDROCHLORIDE 160 MG/1
160 TABLET ORAL EVERY 12 HOURS
Qty: 60 TABLET | Refills: 2 | Status: SHIPPED | OUTPATIENT
Start: 2023-03-15

## 2023-03-15 RX ADMIN — SOTALOL HYDROCHLORIDE 160 MG: 80 TABLET ORAL at 02:58

## 2023-03-15 RX ADMIN — LOSARTAN POTASSIUM 25 MG: 25 TABLET, FILM COATED ORAL at 08:20

## 2023-03-15 RX ADMIN — DABIGATRAN ETEXILATE MESYLATE 150 MG: 150 CAPSULE ORAL at 08:20

## 2023-03-15 NOTE — PROGRESS NOTES
[facility] Clinical Pharmacy Services: Sotalol  Education    Emmanuel Huizar was initiated on sotalol for atrial fibrillation. Counseling points included the followin) Explained indication and need for sotalol for atrial fibrillation, and the testing and labs needed during the initiation phase (EKGs, potassium, magnesium, renal function).  2) Went over dosing and frequency of this medication, stressing importance of taking medication every 12 hours and not missing or doubling up on doses.  3) Discussed any administration, storage, and monitoring instructions with sotalol.  4) Discussed all important drug interactions, including antibiotics, antiemetics, over-the-counter medications and supplements.  5) Explained possible side effects for sotalol.  6) Instructed the patient not to begin or discontinue any medications without informing his/her physician/pharmacist.    Patient expressed understanding and had no further questions.      Luli English, Pharm.D., [facility]   Clinical Staff Pharmacist   Phone Extension #[phone]

## 2023-03-15 NOTE — PLAN OF CARE
Goal Outcome Evaluation:  Plan of Care Reviewed With: patient        Progress: improving  Outcome Evaluation: Patient is AOx4. All vs stable. SR on tele  and On RA. No c/o pain overnight. Sotalol given per order. Will continue to monitor and adjust as needed.    Problem: Adult Inpatient Plan of Care  Goal: Plan of Care Review  Outcome: Ongoing, Progressing  Flowsheets (Taken 3/15/2023 0425)  Progress: improving  Plan of Care Reviewed With: patient  Goal: Patient-Specific Goal (Individualized)  Outcome: Ongoing, Progressing  Goal: Absence of Hospital-Acquired Illness or Injury  Outcome: Ongoing, Progressing  Intervention: Identify and Manage Fall Risk  Description: Perform standard risk assessment on admission using a validated tool or comprehensive approach appropriate to the patient; reassess fall risk frequently, with change in status or transfer to another level of care.  Communicate fall injury risk to interprofessional healthcare team.  Determine need for increased observation, equipment and environmental modification, such as low bed, signage and supportive, nonskid footwear.  Adjust safety measures to individual developmental age, stage and identified risk factors.  Reinforce the importance of safety and physical activity with patient and family.  Perform regular intentional rounding to assess need for position change, pain assessment and personal needs, including assistance with toileting.  Recent Flowsheet Documentation  Taken 3/15/2023 0423 by Tony Villalba, RN  Safety Promotion/Fall Prevention:   activity supervised   assistive device/personal items within reach   clutter free environment maintained   fall prevention program maintained   nonskid shoes/slippers when out of bed   muscle strengthening facilitated   safety round/check completed   room organization consistent  Taken 3/15/2023 0300 by Tony Villalba, RN  Safety Promotion/Fall Prevention:   assistive device/personal items within reach   activity  supervised   mobility aid in reach   lighting adjusted   safety round/check completed   room organization consistent  Taken 3/15/2023 0200 by Tony Villalba RN  Safety Promotion/Fall Prevention:   activity supervised   assistive device/personal items within reach   lighting adjusted   mobility aid in reach   safety round/check completed   room organization consistent  Taken 3/15/2023 0030 by Tony Villalba RN  Safety Promotion/Fall Prevention:   assistive device/personal items within reach   activity supervised   fall prevention program maintained   clutter free environment maintained   nonskid shoes/slippers when out of bed   muscle strengthening facilitated   safety round/check completed   room organization consistent  Taken 3/14/2023 2205 by Tony Villalba RN  Safety Promotion/Fall Prevention:   assistive device/personal items within reach   activity supervised   mobility aid in reach   lighting adjusted   room organization consistent   safety round/check completed   nonskid shoes/slippers when out of bed  Taken 3/14/2023 2015 by Tony Villalba RN  Safety Promotion/Fall Prevention:   activity supervised   assistive device/personal items within reach   lighting adjusted   mobility aid in reach   room organization consistent   safety round/check completed  Intervention: Prevent Skin Injury  Description: Perform a screening for skin injury risk, such as pressure or moisture associated skin damage on admission and at regular intervals throughout hospital stay.  Keep all areas of skin (especially folds) clean and dry.  Maintain adequate skin hydration.  Relieve and redistribute pressure and protect bony prominences; implement measures based on patient-specific risk factors.  Match turning and repositioning schedule to clinical condition.  Encourage weight shift frequently; assist with reposition if unable to complete independently.  Float heels off bed; avoid pressure on the Achilles tendon.  Keep skin free from extended  contact with medical devices.  Encourage functional activity and mobility, as early as tolerated.  Use aids (e.g., slide boards, mechanical lift) during transfer.  Recent Flowsheet Documentation  Taken 3/15/2023 0423 by Tony Villalba RN  Body Position: position changed independently  Taken 3/15/2023 0300 by Tony Villalba RN  Body Position:   position changed independently   supine, legs elevated  Taken 3/15/2023 0200 by Tony Villalba RN  Body Position: position changed independently  Taken 3/15/2023 0030 by Tony Villalba RN  Body Position:   position changed independently   supine, legs elevated  Taken 3/14/2023 2205 by Tony Villalba RN  Body Position:   position changed independently   supine, legs elevated  Taken 3/14/2023 2015 by Tony Villalba RN  Body Position: position changed independently  Intervention: Prevent and Manage VTE (Venous Thromboembolism) Risk  Description: Assess for VTE (venous thromboembolism) risk.  Encourage and assist with early ambulation.  Initiate and maintain compression or other therapy, as indicated, based on identified risk in accordance with organizational protocol and provider order.  Encourage both active and passive leg exercises while in bed, if unable to ambulate.  Recent Flowsheet Documentation  Taken 3/15/2023 0423 by Tony Villalba RN  Activity Management:   activity encouraged   activity adjusted per tolerance  Taken 3/15/2023 0300 by Tony Villalba RN  Activity Management:   activity encouraged   activity adjusted per tolerance  Taken 3/15/2023 0200 by Tony Villalba RN  Activity Management:   activity adjusted per tolerance   activity encouraged  Taken 3/15/2023 0030 by Tony Villalba RN  Activity Management:   activity adjusted per tolerance   activity encouraged  VTE Prevention/Management:   bilateral   compression stockings off  Taken 3/14/2023 2205 by Tony Villalba RN  Activity Management: up ad casimiro  Taken 3/14/2023 2015 by Tony Villalba RN  Activity Management: up ad  casimiro  VTE Prevention/Management:   bilateral   foot pump device on  Intervention: Prevent Infection  Description: Maintain skin and mucous membrane integrity; promote hand, oral and pulmonary hygiene.  Optimize fluid balance, nutrition, sleep and glycemic control to maximize infection resistance.  Identify potential sources of infection early to prevent or mitigate progression of infection (e.g., wound, lines, devices).  Evaluate ongoing need for invasive devices; remove promptly when no longer indicated.  Recent Flowsheet Documentation  Taken 3/15/2023 0423 by Tony iVllalba RN  Infection Prevention:   visitors restricted/screened   single patient room provided   rest/sleep promoted   personal protective equipment utilized   hand hygiene promoted   equipment surfaces disinfected   environmental surveillance performed   cohorting utilized  Taken 3/15/2023 0300 by Tony iVllalba RN  Infection Prevention:   visitors restricted/screened   single patient room provided   rest/sleep promoted   personal protective equipment utilized   equipment surfaces disinfected   environmental surveillance performed   cohorting utilized   hand hygiene promoted  Taken 3/15/2023 0200 by Tony Villalba RN  Infection Prevention:   visitors restricted/screened   single patient room provided   rest/sleep promoted   personal protective equipment utilized   hand hygiene promoted   environmental surveillance performed   cohorting utilized   equipment surfaces disinfected  Taken 3/15/2023 0030 by Tony Villalba RN  Infection Prevention:   visitors restricted/screened   single patient room provided   rest/sleep promoted   personal protective equipment utilized   hand hygiene promoted   equipment surfaces disinfected   environmental surveillance performed   cohorting utilized  Taken 3/14/2023 2205 by Tony Villalba RN  Infection Prevention:   visitors restricted/screened   single patient room provided   rest/sleep promoted   hand hygiene promoted    environmental surveillance performed   cohorting utilized   equipment surfaces disinfected   personal protective equipment utilized  Taken 3/14/2023 2015 by Tony Villalba RN  Infection Prevention:   visitors restricted/screened   single patient room provided   rest/sleep promoted   personal protective equipment utilized   hand hygiene promoted   equipment surfaces disinfected  Goal: Optimal Comfort and Wellbeing  Outcome: Ongoing, Progressing  Intervention: Provide Person-Centered Care  Description: Use a family-focused approach to care.  Develop trust and rapport by proactively providing information, encouraging questions, addressing concerns and offering reassurance.  Acknowledge emotional response to hospitalization.  Recognize and utilize personal coping strategies.  Honor spiritual and cultural preferences.  Recent Flowsheet Documentation  Taken 3/15/2023 0423 by Tony Villalba RN  Trust Relationship/Rapport:   thoughts/feelings acknowledged   reassurance provided   questions encouraged   questions answered   empathic listening provided   care explained   choices provided   emotional support provided  Taken 3/15/2023 0030 by Tony Villalba RN  Trust Relationship/Rapport:   thoughts/feelings acknowledged   reassurance provided   questions encouraged   questions answered   empathic listening provided   emotional support provided   choices provided   care explained  Taken 3/14/2023 2015 by Tony Villalba RN  Trust Relationship/Rapport:   thoughts/feelings acknowledged   reassurance provided   questions encouraged   empathic listening provided   emotional support provided   choices provided   care explained   questions answered  Goal: Readiness for Transition of Care  Outcome: Ongoing, Progressing  Goal: Plan of Care Review  Outcome: Ongoing, Progressing  Flowsheets (Taken 3/15/2023 0425)  Progress: improving  Plan of Care Reviewed With: patient  Goal: Patient-Specific Goal (Individualized)  Outcome: Ongoing,  Progressing  Goal: Absence of Hospital-Acquired Illness or Injury  Outcome: Ongoing, Progressing  Intervention: Identify and Manage Fall Risk  Description: Perform standard risk assessment on admission using a validated tool or comprehensive approach appropriate to the patient; reassess fall risk frequently, with change in status or transfer to another level of care.  Communicate fall injury risk to interprofessional healthcare team.  Determine need for increased observation, equipment and environmental modification, such as low bed, signage and supportive, nonskid footwear.  Adjust safety measures to individual developmental age, stage and identified risk factors.  Reinforce the importance of safety and physical activity with patient and family.  Perform regular intentional rounding to assess need for position change, pain assessment and personal needs, including assistance with toileting.  Recent Flowsheet Documentation  Taken 3/15/2023 0423 by Tony Villalba RN  Safety Promotion/Fall Prevention:   activity supervised   assistive device/personal items within reach   clutter free environment maintained   fall prevention program maintained   nonskid shoes/slippers when out of bed   muscle strengthening facilitated   safety round/check completed   room organization consistent  Taken 3/15/2023 0300 by Tony Villalba RN  Safety Promotion/Fall Prevention:   assistive device/personal items within reach   activity supervised   mobility aid in reach   lighting adjusted   safety round/check completed   room organization consistent  Taken 3/15/2023 0200 by Tony Villalba RN  Safety Promotion/Fall Prevention:   activity supervised   assistive device/personal items within reach   lighting adjusted   mobility aid in reach   safety round/check completed   room organization consistent  Taken 3/15/2023 0030 by Tony Villalba RN  Safety Promotion/Fall Prevention:   assistive device/personal items within reach   activity supervised    fall prevention program maintained   clutter free environment maintained   nonskid shoes/slippers when out of bed   muscle strengthening facilitated   safety round/check completed   room organization consistent  Taken 3/14/2023 2205 by Tony Villalba RN  Safety Promotion/Fall Prevention:   assistive device/personal items within reach   activity supervised   mobility aid in reach   lighting adjusted   room organization consistent   safety round/check completed   nonskid shoes/slippers when out of bed  Taken 3/14/2023 2015 by Tony Villalba RN  Safety Promotion/Fall Prevention:   activity supervised   assistive device/personal items within reach   lighting adjusted   mobility aid in reach   room organization consistent   safety round/check completed  Intervention: Prevent Skin Injury  Description: Perform a screening for skin injury risk, such as pressure or moisture associated skin damage on admission and at regular intervals throughout hospital stay.  Keep all areas of skin (especially folds) clean and dry.  Maintain adequate skin hydration.  Relieve and redistribute pressure and protect bony prominences; implement measures based on patient-specific risk factors.  Match turning and repositioning schedule to clinical condition.  Encourage weight shift frequently; assist with reposition if unable to complete independently.  Float heels off bed; avoid pressure on the Achilles tendon.  Keep skin free from extended contact with medical devices.  Encourage functional activity and mobility, as early as tolerated.  Use aids (e.g., slide boards, mechanical lift) during transfer.  Recent Flowsheet Documentation  Taken 3/15/2023 0423 by Tony Villalba RN  Body Position: position changed independently  Taken 3/15/2023 0300 by Tony Villalba RN  Body Position:   position changed independently   supine, legs elevated  Taken 3/15/2023 0200 by Tony Villalba RN  Body Position: position changed independently  Taken 3/15/2023 0030 by  Tony Villalba RN  Body Position:   position changed independently   supine, legs elevated  Taken 3/14/2023 2205 by Tony Villalba RN  Body Position:   position changed independently   supine, legs elevated  Taken 3/14/2023 2015 by Tony Villalba RN  Body Position: position changed independently  Intervention: Prevent and Manage VTE (Venous Thromboembolism) Risk  Description: Assess for VTE (venous thromboembolism) risk.  Encourage and assist with early ambulation.  Initiate and maintain compression or other therapy, as indicated, based on identified risk in accordance with organizational protocol and provider order.  Encourage both active and passive leg exercises while in bed, if unable to ambulate.  Recent Flowsheet Documentation  Taken 3/15/2023 0423 by Tony Villalba RN  Activity Management:   activity encouraged   activity adjusted per tolerance  Taken 3/15/2023 0300 by Tony Villalba RN  Activity Management:   activity encouraged   activity adjusted per tolerance  Taken 3/15/2023 0200 by Tony Villalba RN  Activity Management:   activity adjusted per tolerance   activity encouraged  Taken 3/15/2023 0030 by Tony Villalba RN  Activity Management:   activity adjusted per tolerance   activity encouraged  VTE Prevention/Management:   bilateral   compression stockings off  Taken 3/14/2023 2205 by Tony Villalba RN  Activity Management: up ad casimiro  Taken 3/14/2023 2015 by Tony Villalba RN  Activity Management: up ad casimiro  VTE Prevention/Management:   bilateral   foot pump device on  Intervention: Prevent Infection  Description: Maintain skin and mucous membrane integrity; promote hand, oral and pulmonary hygiene.  Optimize fluid balance, nutrition, sleep and glycemic control to maximize infection resistance.  Identify potential sources of infection early to prevent or mitigate progression of infection (e.g., wound, lines, devices).  Evaluate ongoing need for invasive devices; remove promptly when no longer indicated.  Recent  Flowsheet Documentation  Taken 3/15/2023 0423 by Tony Villalba RN  Infection Prevention:   visitors restricted/screened   single patient room provided   rest/sleep promoted   personal protective equipment utilized   hand hygiene promoted   equipment surfaces disinfected   environmental surveillance performed   cohorting utilized  Taken 3/15/2023 0300 by Tony Villalba RN  Infection Prevention:   visitors restricted/screened   single patient room provided   rest/sleep promoted   personal protective equipment utilized   equipment surfaces disinfected   environmental surveillance performed   cohorting utilized   hand hygiene promoted  Taken 3/15/2023 0200 by Tony Villalba RN  Infection Prevention:   visitors restricted/screened   single patient room provided   rest/sleep promoted   personal protective equipment utilized   hand hygiene promoted   environmental surveillance performed   cohorting utilized   equipment surfaces disinfected  Taken 3/15/2023 0030 by Tony Villalba RN  Infection Prevention:   visitors restricted/screened   single patient room provided   rest/sleep promoted   personal protective equipment utilized   hand hygiene promoted   equipment surfaces disinfected   environmental surveillance performed   cohorting utilized  Taken 3/14/2023 2205 by Tony Villalba RN  Infection Prevention:   visitors restricted/screened   single patient room provided   rest/sleep promoted   hand hygiene promoted   environmental surveillance performed   cohorting utilized   equipment surfaces disinfected   personal protective equipment utilized  Taken 3/14/2023 2015 by Tony Villalba RN  Infection Prevention:   visitors restricted/screened   single patient room provided   rest/sleep promoted   personal protective equipment utilized   hand hygiene promoted   equipment surfaces disinfected  Goal: Optimal Comfort and Wellbeing  Outcome: Ongoing, Progressing  Intervention: Provide Person-Centered Care  Description: Use a  family-focused approach to care.  Develop trust and rapport by proactively providing information, encouraging questions, addressing concerns and offering reassurance.  Acknowledge emotional response to hospitalization.  Recognize and utilize personal coping strategies.  Honor spiritual and cultural preferences.  Recent Flowsheet Documentation  Taken 3/15/2023 0423 by Tony Villalba RN  Trust Relationship/Rapport:   thoughts/feelings acknowledged   reassurance provided   questions encouraged   questions answered   empathic listening provided   care explained   choices provided   emotional support provided  Taken 3/15/2023 0030 by Tony Villalba RN  Trust Relationship/Rapport:   thoughts/feelings acknowledged   reassurance provided   questions encouraged   questions answered   empathic listening provided   emotional support provided   choices provided   care explained  Taken 3/14/2023 2015 by Tony Villalba RN  Trust Relationship/Rapport:   thoughts/feelings acknowledged   reassurance provided   questions encouraged   empathic listening provided   emotional support provided   choices provided   care explained   questions answered  Goal: Readiness for Transition of Care  Outcome: Ongoing, Progressing  Goal: Plan of Care Review  Outcome: Ongoing, Progressing  Flowsheets (Taken 3/15/2023 0425)  Progress: improving  Plan of Care Reviewed With: patient  Goal: Patient-Specific Goal (Individualized)  Outcome: Ongoing, Progressing  Goal: Absence of Hospital-Acquired Illness or Injury  Outcome: Ongoing, Progressing  Intervention: Identify and Manage Fall Risk  Description: Perform standard risk assessment on admission using a validated tool or comprehensive approach appropriate to the patient; reassess fall risk frequently, with change in status or transfer to another level of care.  Communicate fall injury risk to interprofessional healthcare team.  Determine need for increased observation, equipment and environmental  modification, such as low bed, signage and supportive, nonskid footwear.  Adjust safety measures to individual developmental age, stage and identified risk factors.  Reinforce the importance of safety and physical activity with patient and family.  Perform regular intentional rounding to assess need for position change, pain assessment and personal needs, including assistance with toileting.  Recent Flowsheet Documentation  Taken 3/15/2023 0423 by Tony Villalba RN  Safety Promotion/Fall Prevention:   activity supervised   assistive device/personal items within reach   clutter free environment maintained   fall prevention program maintained   nonskid shoes/slippers when out of bed   muscle strengthening facilitated   safety round/check completed   room organization consistent  Taken 3/15/2023 0300 by Tony Villalba RN  Safety Promotion/Fall Prevention:   assistive device/personal items within reach   activity supervised   mobility aid in reach   lighting adjusted   safety round/check completed   room organization consistent  Taken 3/15/2023 0200 by Tony Villalba RN  Safety Promotion/Fall Prevention:   activity supervised   assistive device/personal items within reach   lighting adjusted   mobility aid in reach   safety round/check completed   room organization consistent  Taken 3/15/2023 0030 by Tony Villalba RN  Safety Promotion/Fall Prevention:   assistive device/personal items within reach   activity supervised   fall prevention program maintained   clutter free environment maintained   nonskid shoes/slippers when out of bed   muscle strengthening facilitated   safety round/check completed   room organization consistent  Taken 3/14/2023 2205 by Tony Villalba RN  Safety Promotion/Fall Prevention:   assistive device/personal items within reach   activity supervised   mobility aid in reach   lighting adjusted   room organization consistent   safety round/check completed   nonskid shoes/slippers when out of bed  Taken  3/14/2023 2015 by Tony Villalba RN  Safety Promotion/Fall Prevention:   activity supervised   assistive device/personal items within reach   lighting adjusted   mobility aid in reach   room organization consistent   safety round/check completed  Intervention: Prevent Skin Injury  Description: Perform a screening for skin injury risk, such as pressure or moisture associated skin damage on admission and at regular intervals throughout hospital stay.  Keep all areas of skin (especially folds) clean and dry.  Maintain adequate skin hydration.  Relieve and redistribute pressure and protect bony prominences; implement measures based on patient-specific risk factors.  Match turning and repositioning schedule to clinical condition.  Encourage weight shift frequently; assist with reposition if unable to complete independently.  Float heels off bed; avoid pressure on the Achilles tendon.  Keep skin free from extended contact with medical devices.  Encourage functional activity and mobility, as early as tolerated.  Use aids (e.g., slide boards, mechanical lift) during transfer.  Recent Flowsheet Documentation  Taken 3/15/2023 0423 by Tony Villalba RN  Body Position: position changed independently  Taken 3/15/2023 0300 by Tony Villalba RN  Body Position:   position changed independently   supine, legs elevated  Taken 3/15/2023 0200 by Tony Villalba RN  Body Position: position changed independently  Taken 3/15/2023 0030 by Tony Villalba RN  Body Position:   position changed independently   supine, legs elevated  Taken 3/14/2023 2205 by Tony Villalba RN  Body Position:   position changed independently   supine, legs elevated  Taken 3/14/2023 2015 by Tony Villalba RN  Body Position: position changed independently  Intervention: Prevent and Manage VTE (Venous Thromboembolism) Risk  Description: Assess for VTE (venous thromboembolism) risk.  Encourage and assist with early ambulation.  Initiate and maintain compression or other  therapy, as indicated, based on identified risk in accordance with organizational protocol and provider order.  Encourage both active and passive leg exercises while in bed, if unable to ambulate.  Recent Flowsheet Documentation  Taken 3/15/2023 0423 by Tony Villalba RN  Activity Management:   activity encouraged   activity adjusted per tolerance  Taken 3/15/2023 0300 by Tony Villalba RN  Activity Management:   activity encouraged   activity adjusted per tolerance  Taken 3/15/2023 0200 by Tony Villalba RN  Activity Management:   activity adjusted per tolerance   activity encouraged  Taken 3/15/2023 0030 by Tony Villalba RN  Activity Management:   activity adjusted per tolerance   activity encouraged  VTE Prevention/Management:   bilateral   compression stockings off  Taken 3/14/2023 2205 by Tony Villalba RN  Activity Management: up ad casimiro  Taken 3/14/2023 2015 by Tony Villalba RN  Activity Management: up ad casimiro  VTE Prevention/Management:   bilateral   foot pump device on  Intervention: Prevent Infection  Description: Maintain skin and mucous membrane integrity; promote hand, oral and pulmonary hygiene.  Optimize fluid balance, nutrition, sleep and glycemic control to maximize infection resistance.  Identify potential sources of infection early to prevent or mitigate progression of infection (e.g., wound, lines, devices).  Evaluate ongoing need for invasive devices; remove promptly when no longer indicated.  Recent Flowsheet Documentation  Taken 3/15/2023 0423 by Tony Villalba RN  Infection Prevention:   visitors restricted/screened   single patient room provided   rest/sleep promoted   personal protective equipment utilized   hand hygiene promoted   equipment surfaces disinfected   environmental surveillance performed   cohorting utilized  Taken 3/15/2023 0300 by Tony Villalba RN  Infection Prevention:   visitors restricted/screened   single patient room provided   rest/sleep promoted   personal protective  equipment utilized   equipment surfaces disinfected   environmental surveillance performed   cohorting utilized   hand hygiene promoted  Taken 3/15/2023 0200 by Tony Villalba RN  Infection Prevention:   visitors restricted/screened   single patient room provided   rest/sleep promoted   personal protective equipment utilized   hand hygiene promoted   environmental surveillance performed   cohorting utilized   equipment surfaces disinfected  Taken 3/15/2023 0030 by Tony Villalba RN  Infection Prevention:   visitors restricted/screened   single patient room provided   rest/sleep promoted   personal protective equipment utilized   hand hygiene promoted   equipment surfaces disinfected   environmental surveillance performed   cohorting utilized  Taken 3/14/2023 2205 by Tony Villalba RN  Infection Prevention:   visitors restricted/screened   single patient room provided   rest/sleep promoted   hand hygiene promoted   environmental surveillance performed   cohorting utilized   equipment surfaces disinfected   personal protective equipment utilized  Taken 3/14/2023 2015 by Tony Villalba RN  Infection Prevention:   visitors restricted/screened   single patient room provided   rest/sleep promoted   personal protective equipment utilized   hand hygiene promoted   equipment surfaces disinfected  Goal: Optimal Comfort and Wellbeing  Outcome: Ongoing, Progressing  Intervention: Provide Person-Centered Care  Description: Use a family-focused approach to care.  Develop trust and rapport by proactively providing information, encouraging questions, addressing concerns and offering reassurance.  Acknowledge emotional response to hospitalization.  Recognize and utilize personal coping strategies.  Honor spiritual and cultural preferences.  Recent Flowsheet Documentation  Taken 3/15/2023 0423 by Tony Villalba RN  Trust Relationship/Rapport:   thoughts/feelings acknowledged   reassurance provided   questions encouraged   questions  answered   empathic listening provided   care explained   choices provided   emotional support provided  Taken 3/15/2023 0030 by Tony Villalba, RN  Trust Relationship/Rapport:   thoughts/feelings acknowledged   reassurance provided   questions encouraged   questions answered   empathic listening provided   emotional support provided   choices provided   care explained  Taken 3/14/2023 2015 by Tony Villalba, RN  Trust Relationship/Rapport:   thoughts/feelings acknowledged   reassurance provided   questions encouraged   empathic listening provided   emotional support provided   choices provided   care explained   questions answered  Goal: Readiness for Transition of Care  Outcome: Ongoing, Progressing

## 2023-03-15 NOTE — DISCHARGE SUMMARY
DISCHARGE NOTE    Patient Name: Emmanuel Huizar  Age/Sex: 67 y.o. male  : 1955  MRN: [id number]    Date of Discharge:  3/15/2023   Date of Admit: 3/13/2023  Encounter Provider: JACKY Cardenas  Place of Service: [facility] CARDIOLOGY  Patient Care Team:  Provider, No Known as PCP - General    Subjective:     Discharge Diagnosis:    Persistent atrial fibrillation (HCC)    Atrial fibrillation (HCC)      Hospital Course:   Emmanuel Huizar is a 67 year old male who is followed by Dr. Junior for persistent atrial fibrillation.     In 2022 he presented with syncope and was diagnosed with subdural hematoma. During that admission he went into AF with RVR.     He saw Dr. Gomez in January and a monitor was done, showing persistent AF with controlled ventricular rates. He was complaining of fatigue and low power.     He saw Dr. Junior in February. After discussion he recommended admission for sotalol initiation.         Patient was admitted 3/13/2023 for sotalol. He was started on 160 mg BID and his diltiazem was stopped. He was cardioverted on 3/14 and is maintaining NSR. His QT interval is WNL on sotalol 160mg. Will discharge him today on 160mg BID. Diltiazem discontinued. He will follow up in 4 weeks.       Vital Signs  Temp:  [97.9 °F (36.6 °C)-98.9 °F (37.2 °C)] 98.9 °F (37.2 °C)  Heart Rate:  [69-93] 69  Resp:  [18-24] 18  BP: (113-146)/() 132/78    Intake/Output Summary (Last 24 hours) at 3/15/2023 0754  Last data filed at 3/15/2023 0600  Gross per 24 hour   Intake 0 ml   Output --   Net 0 ml       Physical Exam:    General Appearance: No acute distress, well developed and well nourished.   Eyes: Conjunctiva and lids: No erythema, swelling, or discharge. Sclera non-icteric.   HENT: Atraumatic, normocephalic. External eyes, ears, and nose normal.    Respiratory: No signs of respiratory distress. Respiration rhythm and depth normal.   Clear to auscultation. No rales, crackles, rhonchi, or wheezing auscultated.   Cardiovascular:  Heart Rate and Rhythm: Normal, Heart Sounds: Normal S1 and S2. No S3 or S4 noted.  Gastrointestinal:  Abdomen soft, non-distended, non-tender.  Musculoskeletal: Normal movement of extremities  Skin: Warm and dry.   Psychiatric: Patient alert and oriented to person, place, and time. Speech and behavior appropriate. Normal mood and affect.    Labs:   Results from last 7 days   Lab Units 03/15/23  0241 03/14/23  0317 03/13/23  1210   SODIUM mmol/L 140 139 141   POTASSIUM mmol/L 4.1 4.2 3.9   CHLORIDE mmol/L 109* 105 109*   CO2 mmol/L 21.0* 21.7* 22.0   BUN mg/dL 17 18 17   CREATININE mg/dL 1.06 0.86 1.00   GLUCOSE mg/dL 89 91 107*   CALCIUM mg/dL 8.4* 9.2 9.0   AST (SGOT) U/L  --   --  13   ALT (SGPT) U/L  --   --  7         Results from last 7 days   Lab Units 03/13/23  1210   WBC 10*3/mm3 8.49   HEMOGLOBIN g/dL 14.4   HEMATOCRIT % 41.4   PLATELETS 10*3/mm3 314         Results from last 7 days   Lab Units 03/15/23  0241 03/14/23  0317 03/13/23  1210   MAGNESIUM mg/dL 2.4 2.4 2.2                   Discharge Diet:    Dietary Orders (From admission, onward)     Start     Ordered    03/14/23 1603  Diet: Regular/House Diet; Texture: Regular Texture (IDDSI 7); Fluid Consistency: Thin (IDDSI 0)  Diet Effective Now        References:    Diet Order [place]   Question Answer Comment   Diets: Regular/House Diet    Texture: Regular Texture (IDDSI 7)    Fluid Consistency: Thin (IDDSI 0)        03/14/23 1602                  Activity at Discharge:  as tolerated     Discharge Medications     Discharge Medications      ASK your doctor about these medications      Instructions Start Date   dabigatran etexilate 150 MG capsu  Commonly known as: Pradaxa   150 mg, Oral, 2 Times Daily      dilTIAZem  MG 24 hr capsule  Commonly known as: CARDIZEM CD    Take 1 capsule by mouth once daily for 30 days      losartan 25 MG tablet  Commonly known as: COZAAR   Take 1 tablet by mouth once daily             Discharge disposition: Home     Follow-up Appointments    No future appointments.      JACKY Cardenas  03/15/23  07:54 EDT

## 2023-03-15 NOTE — PLAN OF CARE
Goal Outcome Evaluation:  Plan of Care Reviewed With: patient        Progress: improving   66 yo male s/p cardioversion remains in NSR. VSS. Pt denies any chest pain, palpitations of soa. Pt to be discharged on Sotalol today with follow up appt with EP in 4 weeks

## 2023-03-16 ENCOUNTER — READMISSION MANAGEMENT (OUTPATIENT)
Dept: CALL CENTER | Facility: HOSPITAL | Age: 68
End: 2023-03-16
Payer: MEDICARE

## 2023-03-16 NOTE — OUTREACH NOTE
Prep Survey    Flowsheet Row Responses   [facility] facility patient discharged from? [place]   Is LACE score < 7 ? No   Eligibility Readm Mgmt   Discharge diagnosis Persistent atrial fibrillation   Does the patient have one of the following disease processes/diagnoses(primary or secondary)? Other   Does the patient have Home health ordered? No   Is there a DME ordered? No   Prep survey completed? Yes          Karen VALENTINO - Registered Nurse

## 2023-03-21 ENCOUNTER — READMISSION MANAGEMENT (OUTPATIENT)
Dept: CALL CENTER | Facility: HOSPITAL | Age: 68
End: 2023-03-21
Payer: MEDICARE

## 2023-04-04 ENCOUNTER — READMISSION MANAGEMENT (OUTPATIENT)
Dept: CALL CENTER | Facility: HOSPITAL | Age: 68
End: 2023-04-04
Payer: MEDICARE

## 2023-04-04 NOTE — OUTREACH NOTE
Medical Week 3 Survey    Flowsheet Row Responses   [facility] patient discharged from? [place]   Does the patient have one of the following disease processes/diagnoses(primary or secondary)? Other   Week 3 attempt successful? Yes   Call start time 1634   Call end time 1649   Discharge diagnosis Persistent atrial fibrillation   Person spoke with today (if not patient) and relationship pt   Meds reviewed with patient/caregiver? Yes   Is the patient having any side effects they believe may be caused by any medication additions or changes? Yes   Side effects comments  Pt feels afib is not responding as well taking sotalol vs Cardizem.   Does the patient have all medications ordered at discharge? Yes   Is the patient taking all medications as directed (includes completed medication regime)? Yes   Does the patient have a primary care provider?  No   Does the patient have an appointment with their PCP within 7 days of discharge? No   Has the patient kept scheduled appointments due by today? N/A   Comments Pt in chronic afib, advised to call cardiologist office to report no improvment taking sotalol   Psychosocial issues? No   What is the patient's perception of their health status since discharge? Same   Is the patient/caregiver able to teach back signs and symptoms related to disease process for when to call PCP? Yes   Is the patient/caregiver able to teach back signs and symptoms related to disease process for when to call 911? Yes   Is the patient/caregiver able to teach back the hierarchy of who to call/visit for symptoms/problems? PCP, Specialist, Home health nurse, Urgent Care, ED, 911 Yes   If the patient is a current smoker, are they able to teach back resources for cessation? Not a smoker   Week 3 Call Completed? Yes   Is the patient interested in additional calls from an ambulatory ?  NOTE:  applies to high risk patients requiring additional follow-up. No   Wrap up additional comments Pt  states he is not doing any better, and that he stays in chronic afib. Pt states he is taking sotalol, and feels Cardizem contolled afib better. Pt advised to call cardiologist office to report chronic afib, and that he feels Cardizem helped with afib better than sotalol.  Pt verbalized understanding.          Monika MARTÍNEZ - Registered Nurse

## 2023-04-18 ENCOUNTER — OFFICE VISIT (OUTPATIENT)
Dept: CARDIOLOGY | Facility: CLINIC | Age: 68
End: 2023-04-18
Payer: MEDICARE

## 2023-04-18 VITALS
DIASTOLIC BLOOD PRESSURE: 84 MMHG | HEIGHT: 75 IN | WEIGHT: 187 LBS | HEART RATE: 56 BPM | SYSTOLIC BLOOD PRESSURE: 122 MMHG | BODY MASS INDEX: 23.25 KG/M2

## 2023-04-18 DIAGNOSIS — I48.19 PERSISTENT ATRIAL FIBRILLATION: Primary | ICD-10-CM

## 2023-04-18 DIAGNOSIS — Z51.81 ENCOUNTER FOR MONITORING SOTALOL THERAPY: ICD-10-CM

## 2023-04-18 DIAGNOSIS — R42 ORTHOSTATIC DIZZINESS: ICD-10-CM

## 2023-04-18 DIAGNOSIS — Z79.899 ENCOUNTER FOR MONITORING SOTALOL THERAPY: ICD-10-CM

## 2023-04-18 PROBLEM — I48.91 ATRIAL FIBRILLATION: Status: RESOLVED | Noted: 2022-12-13 | Resolved: 2023-04-18

## 2023-04-18 NOTE — PROGRESS NOTES
Date of Office Visit: 2023  Encounter Provider: JACKY Pickens  Place of Service: [facility] CARDIOLOGY  Patient Name: Emmanuel Huizar  :1955    Chief Complaint   Patient presents with   • persistent AFIB   • sotalol admission   • s/p cardioversion 3/14   :     HPI: Emmanuel Huizar is a 67 y.o. male who is followed by Dr. Gomez, referred to Dr. Junior---persistent AF.     2022 presented w/syncope>>SDH--during admission went into AF w/RVR, monitor in  showed persistent AF--low energy, fatigue.     2022 Echo--EF 56-60%, mod biatrial dilatation, mild to mod TR.    Saw Dr. Junior, recommended admission for sotalol/CV.    3/13/2323 Admitted for sotalol, successful CV 3/14, dc home 3/15 on 160 mg BID.     Presents for follow up.     He thinks he may be have episodes of AF--he has not felt irregular heart beat (but he did not before) but still has fatigue and low power--he is in SR today so the said maybe it is the medication.    He has some times of what he describes orthostatic dizziness---after laying or sitting, gets up and is lightheaded, off balance feeling---not near syncopal or any symptoms close to that.     He also notes increased dizziness/off-balance when he has 2 drinks with supper about 3 nights per week--this is over a couple hours period--not shots--we discussed Etoh and increase of AF.     No chest pain, dyspnea, PND, orthopnea or edema.    He has not been checking b/p so not sure if low when he has the lightheaded episodes.    Dabigatran for AC.      Past Medical History:   Diagnosis Date   • Atrial fibrillation    • History of cardioversion-3/2023    • Subdural hematoma--s/p syncope/fall- 2022        Past Surgical History:   Procedure Laterality Date   • HERNIA REPAIR Bilateral        Social History     Socioeconomic History   • Marital status:    Tobacco Use   • Smoking status: Never     Passive exposure: Never   • Smokeless  "tobacco: Never   • Tobacco comments:     Daily caffeine - soda   Vaping Use   • Vaping Use: Never used   Substance and Sexual Activity   • Alcohol use: Yes     Alcohol/week: 7.0 standard drinks     Types: 7 Shots of liquor per week     Comment: 7 drinks per week   • Drug use: Never   • Sexual activity: Defer       Family History   Problem Relation Age of Onset   • Heart failure Father 63        cabg   • Hypertension Father    • Atrial fibrillation Brother         cardiac ablation   • Atrial fibrillation Brother         cardiac ablationm       Review of Systems   Constitutional: Positive for malaise/fatigue. Negative for chills and fever.   Cardiovascular: Negative for chest pain, dyspnea on exertion, leg swelling, near-syncope, orthopnea, palpitations, paroxysmal nocturnal dyspnea and syncope.   Respiratory: Negative for cough and shortness of breath.    Hematologic/Lymphatic: Negative.    Musculoskeletal: Negative for joint pain, joint swelling and myalgias.   Gastrointestinal: Negative for abdominal pain, diarrhea, melena, nausea and vomiting.   Genitourinary: Negative for frequency and hematuria.   Neurological: Positive for light-headedness and loss of balance (off-balance feeling intermittently). Negative for numbness, paresthesias and seizures.   Allergic/Immunologic: Negative.    All other systems reviewed and are negative.      No Known Allergies      Current Outpatient Medications:   •  dabigatran etexilate (Pradaxa) 150 MG capsu, Take 1 capsule by mouth 2 (Two) Times a Day., Disp: 60 capsule, Rfl: 11  •  sotalol (BETAPACE) 160 MG tablet, Take 1 tablet by mouth Every 12 (Twelve) Hours., Disp: 60 tablet, Rfl: 2      Objective:     Vitals:    04/18/23 1044   BP: 122/84   Pulse: 56   Weight: 84.8 kg (187 lb)   Height: 190.5 cm (75\")     Body mass index is 23.37 kg/m².    PHYSICAL EXAM:    Vitals Reviewed.   General Appearance: No acute distress, well developed and well nourished.   Eyes: Conjunctiva and lids: " No erythema, swelling, or discharge. Sclera non-icteric.   HENT: Atraumatic, normocephalic. External eyes, ears, and nose normal.   Respiratory: No signs of respiratory distress. Respiration rhythm and depth normal.   Clear to auscultation. No rales, crackles, rhonchi, or wheezing auscultated.   Cardiovascular:  Heart Rate and Rhythm: Normal, Heart Sounds: Normal S1 and S2. No S3 or S4 noted.  Murmurs: No murmurs noted. No rubs, thrills, or gallops.   Lower Extremities: No edema noted.  Gastrointestinal:  Abdomen soft, non-distended, non-tender.   Musculoskeletal: Normal movement of extremities  Skin: Warm and dry.   Psychiatric: Patient alert and oriented to person, place, and time. Speech and behavior appropriate. Normal mood and affect.       ECG 12 Lead    Date/Time: 4/18/2023 10:49 AM  Performed by: Sandra Foster APRN  Authorized by: Sandra Foster APRN   Comparison: compared with previous ECG   Similar to previous ECG  Rhythm: sinus rhythm  BPM: 56  Comments: QTc okay              Assessment:       Diagnosis Plan   1. Persistent atrial fibrillation  ECG 12 Lead    Holter Monitor - 72 Hour Up To 15 Days      2. Encounter for monitoring sotalol therapy        3. Orthostatic dizziness               Plan:       1.-2. Persistent AF, s/p admission --sotalol started and CV in March--SR today and QTc okay---he is unsure whether he is having any AF---he still has fatigue and low power---discussed--may be medication but may be episodes of PAF.     3. Orthostatic dizziness---b/p 122/84 today---will stop losartan and he is going to monitor b/p    Will check a Zio and call with results and determine further follow up at that time.    As always, it has been a pleasure to participate in your patient's care.      Sincerely,         JACKY Lancaster

## 2023-05-09 ENCOUNTER — TELEPHONE (OUTPATIENT)
Dept: CARDIOLOGY | Facility: CLINIC | Age: 68
End: 2023-05-09
Payer: MEDICARE

## 2023-05-09 NOTE — TELEPHONE ENCOUNTER
Called and discussed monitor results---had talked with Dr. Junior and with 28% burden on Zio the plan was going to be to offer ablation    Mr. Huizar says he feels the best he has in a long time--he is doing everything he wants to and feels good.     He was surprised that the monitor showed that much afib.    Dizziness improved off of losartan     Long discussion--for now will stay the course with sotalol and dabigatran    Will have him follow up with Dr. Junior in 3-4 months

## 2023-06-16 RX ORDER — SOTALOL HYDROCHLORIDE 160 MG/1
160 TABLET ORAL EVERY 12 HOURS
Qty: 60 TABLET | Refills: 2 | Status: SHIPPED | OUTPATIENT
Start: 2023-06-16

## 2023-09-18 RX ORDER — SOTALOL HYDROCHLORIDE 160 MG/1
160 TABLET ORAL EVERY 12 HOURS
Qty: 180 TABLET | Refills: 0 | Status: SHIPPED | OUTPATIENT
Start: 2023-09-18

## 2023-09-28 RX ORDER — SOTALOL HYDROCHLORIDE 160 MG/1
160 TABLET ORAL EVERY 12 HOURS
Qty: 60 TABLET | Refills: 5 | Status: SHIPPED | OUTPATIENT
Start: 2023-09-28 | End: 2023-09-29 | Stop reason: SDUPTHER

## 2023-09-29 RX ORDER — SOTALOL HYDROCHLORIDE 160 MG/1
160 TABLET ORAL EVERY 12 HOURS
Qty: 60 TABLET | Refills: 5 | Status: SHIPPED | OUTPATIENT
Start: 2023-09-29

## 2023-10-13 ENCOUNTER — OFFICE VISIT (OUTPATIENT)
Age: 68
End: 2023-10-13
Payer: MEDICARE

## 2023-10-13 VITALS
BODY MASS INDEX: 23.62 KG/M2 | WEIGHT: 190 LBS | HEART RATE: 61 BPM | SYSTOLIC BLOOD PRESSURE: 140 MMHG | DIASTOLIC BLOOD PRESSURE: 88 MMHG | HEIGHT: 75 IN

## 2023-10-13 DIAGNOSIS — I48.19 PERSISTENT ATRIAL FIBRILLATION: Primary | ICD-10-CM

## 2023-10-13 PROCEDURE — 99214 OFFICE O/P EST MOD 30 MIN: CPT | Performed by: INTERNAL MEDICINE

## 2023-10-13 PROCEDURE — 93000 ELECTROCARDIOGRAM COMPLETE: CPT | Performed by: INTERNAL MEDICINE

## 2023-10-13 NOTE — PROGRESS NOTES
Date of Office Visit: 10/13/2023  Encounter Provider: Jonathan Junior MD  Place of Service: [facility] CARDIOLOGY  Patient Name: Emmanuel Huizar  : 1955    Subjective:     Encounter Date:10/13/2023      Patient ID: Emmanuel Huizar is a 67 y.o. male who has a cc of  PAF and we have him on sotalol. He feels great     Rare and short dur episodes only     The patient had a good year.   No anginal chest pain,   No sig beltran,   No soa,   No fainting,  No orthostasis.   No edema.   Exercise tolerance: great     There have been no hospital admission since the last visit.     There have been no bleeding events.       Past Medical History:   Diagnosis Date    Atrial fibrillation     History of cardioversion-3/2023     Subdural hematoma--s/p syncope/fall- 2022        Social History     Socioeconomic History    Marital status:    Tobacco Use    Smoking status: Never     Passive exposure: Never    Smokeless tobacco: Never    Tobacco comments:     Daily caffeine - soda   Vaping Use    Vaping Use: Never used   Substance and Sexual Activity    Alcohol use: Yes     Alcohol/week: 7.0 standard drinks of alcohol     Types: 7 Shots of liquor per week     Comment: 7 drinks per week    Drug use: Never    Sexual activity: Defer       Family History   Problem Relation Age of Onset    Heart failure Father 63        cabg    Hypertension Father     Atrial fibrillation Brother         cardiac ablation    Atrial fibrillation Brother         cardiac ablationm       Review of Systems   Constitutional: Negative for fever and night sweats.   HENT:  Negative for ear pain and stridor.    Eyes:  Negative for discharge and visual halos.   Cardiovascular:  Negative for cyanosis.   Respiratory:  Negative for hemoptysis and sputum production.    Hematologic/Lymphatic: Negative for adenopathy.   Skin:  Negative for nail changes and unusual hair distribution.   Musculoskeletal:  Negative for gout and joint swelling.  "  Gastrointestinal:  Negative for bowel incontinence and flatus.   Genitourinary:  Negative for dysuria and flank pain.   Neurological:  Negative for seizures and tremors.   Psychiatric/Behavioral:  Negative for altered mental status. The patient is not nervous/anxious.             Objective:     Vitals:    10/13/23 1253   BP: 140/88   Pulse: 61   Weight: 86.2 kg (190 lb)   Height: 190.5 cm (75\")         Eyes:      General:         Right eye: No discharge.         Left eye: No discharge.   HENT:      Head: Normocephalic and atraumatic.   Neck:      Thyroid: No thyromegaly.      Vascular: No JVD.   Pulmonary:      Effort: Pulmonary effort is normal.      Breath sounds: Normal breath sounds. No rales.   Cardiovascular:      Normal rate. Regular rhythm.      No gallop.    Edema:     Peripheral edema absent.   Abdominal:      General: Bowel sounds are normal.      Palpations: Abdomen is soft.      Tenderness: There is no abdominal tenderness.   Musculoskeletal: Normal range of motion.         General: No deformity. Skin:     General: Skin is warm and dry.      Findings: No erythema.   Neurological:      Mental Status: Alert and oriented to person, place, and time.      Motor: Normal muscle tone.   Psychiatric:         Behavior: Behavior normal.         Thought Content: Thought content normal.           ECG 12 Lead    Date/Time: 10/13/2023 1:19 PM  Performed by: Jonathan Junior MD    Authorized by: Jonathan Junior MD  Comparison: compared with previous ECG   Similar to previous ECG  Rhythm: sinus rhythm  Comments: QT ok           Lab Review:       Assessment:          Diagnosis Plan   1. Persistent atrial fibrillation               Plan:     AF -- great control and QT is ok.     He feels good.     On dabi     He asked about coming off - - he is in a gray area for sure. Preference sensitive decsion       "

## 2023-11-15 ENCOUNTER — APPOINTMENT (OUTPATIENT)
Dept: CT IMAGING | Facility: HOSPITAL | Age: 68
End: 2023-11-15
Payer: MEDICARE

## 2023-11-15 ENCOUNTER — HOSPITAL ENCOUNTER (EMERGENCY)
Facility: HOSPITAL | Age: 68
Discharge: HOME | End: 2023-11-15
Attending: EMERGENCY MEDICINE | Admitting: EMERGENCY MEDICINE
Payer: MEDICARE

## 2023-11-15 VITALS
WEIGHT: 190 LBS | HEIGHT: 75 IN | BODY MASS INDEX: 23.62 KG/M2 | OXYGEN SATURATION: 96 % | RESPIRATION RATE: 16 BRPM | TEMPERATURE: 97.1 F | DIASTOLIC BLOOD PRESSURE: 84 MMHG | HEART RATE: 86 BPM | SYSTOLIC BLOOD PRESSURE: 127 MMHG

## 2023-11-15 DIAGNOSIS — F10.920 ALCOHOLIC INTOXICATION WITHOUT COMPLICATION: ICD-10-CM

## 2023-11-15 DIAGNOSIS — I48.11 LONGSTANDING PERSISTENT ATRIAL FIBRILLATION: ICD-10-CM
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
## 2023-11-15 DIAGNOSIS — S09.90XA CLOSED HEAD INJURY, INITIAL ENCOUNTER: ICD-10-CM

## 2023-11-15 LAB
ALBUMIN SERPL-MCNC: 4.4 G/DL (ref 3.5–5.2)
ALBUMIN/GLOB SERPL: 1.3 G/DL
ALP SERPL-CCNC: 69 U/L (ref 39–117)
ALT SERPL W P-5'-P-CCNC: 9 U/L (ref 1–41)
ANION GAP SERPL CALCULATED.3IONS-SCNC: 13.4 MMOL/L (ref 5–15)
APTT PPP: 25 SECONDS (ref 22.7–35.4)
AST SERPL-CCNC: 15 U/L (ref 1–40)
BASOPHILS # BLD AUTO: 0.1 10*3/MM3 (ref 0–0.2)
BASOPHILS NFR BLD AUTO: 1.3 % (ref 0–1.5)
BILIRUB SERPL-MCNC: 1.1 MG/DL (ref 0–1.2)
BUN SERPL-MCNC: 12 MG/DL (ref 8–23)
BUN/CREAT SERPL: 10.7 (ref 7–25)
CALCIUM SPEC-SCNC: 8.9 MG/DL (ref 8.6–10.5)
CHLORIDE SERPL-SCNC: 103 MMOL/L (ref 98–107)
CO2 SERPL-SCNC: 24.6 MMOL/L (ref 22–29)
CREAT SERPL-MCNC: 1.12 MG/DL (ref 0.76–1.27)
DEPRECATED RDW RBC AUTO: 48.2 FL (ref 37–54)
EGFRCR SERPLBLD CKD-EPI 2021: 72 ML/MIN/1.73
EOSINOPHIL # BLD AUTO: 0.55 10*3/MM3 (ref 0–0.4)
EOSINOPHIL NFR BLD AUTO: 7.4 % (ref 0.3–6.2)
ERYTHROCYTE [DISTWIDTH] IN BLOOD BY AUTOMATED COUNT: 13 % (ref 12.3–15.4)
ETHANOL BLD-MCNC: 205 MG/DL (ref 0–10)
ETHANOL UR QL: 0.2 %
GLOBULIN UR ELPH-MCNC: 3.4 GM/DL
GLUCOSE SERPL-MCNC: 110 MG/DL (ref 65–99)
HCT VFR BLD AUTO: 43.1 % (ref 37.5–51)
HGB BLD-MCNC: 15.3 G/DL (ref 13–17.7)
IMM GRANULOCYTES # BLD AUTO: 0.03 10*3/MM3 (ref 0–0.05)
IMM GRANULOCYTES NFR BLD AUTO: 0.4 % (ref 0–0.5)
INR PPP: 1.06 (ref 0.9–1.1)
LYMPHOCYTES # BLD AUTO: 2.49 10*3/MM3 (ref 0.7–3.1)
LYMPHOCYTES NFR BLD AUTO: 33.4 % (ref 19.6–45.3)
MCH RBC QN AUTO: 36 PG (ref 26.6–33)
MCHC RBC AUTO-ENTMCNC: 35.5 G/DL (ref 31.5–35.7)
MCV RBC AUTO: 101.4 FL (ref 79–97)
MONOCYTES # BLD AUTO: 0.71 10*3/MM3 (ref 0.1–0.9)
MONOCYTES NFR BLD AUTO: 9.5 % (ref 5–12)
NEUTROPHILS NFR BLD AUTO: 3.57 10*3/MM3 (ref 1.7–7)
NEUTROPHILS NFR BLD AUTO: 48 % (ref 42.7–76)
NRBC BLD AUTO-RTO: 0 /100 WBC (ref 0–0.2)
PLATELET # BLD AUTO: 353 10*3/MM3 (ref 140–450)
PMV BLD AUTO: 10.6 FL (ref 6–12)
POTASSIUM SERPL-SCNC: 3.8 MMOL/L (ref 3.5–5.2)
PROT SERPL-MCNC: 7.8 G/DL (ref 6–8.5)
PROTHROMBIN TIME: 13.9 SECONDS (ref 11.7–14.2)
QT INTERVAL: 448 MS
QTC INTERVAL: 542 MS
RBC # BLD AUTO: 4.25 10*6/MM3 (ref 4.14–5.8)
SODIUM SERPL-SCNC: 141 MMOL/L (ref 136–145)
WBC NRBC COR # BLD: 7.45 10*3/MM3 (ref 3.4–10.8)

## 2023-11-15 PROCEDURE — 99284 EMERGENCY DEPT VISIT MOD MDM: CPT

## 2023-11-15 PROCEDURE — 70450 CT HEAD/BRAIN W/O DYE: CPT

## 2023-11-15 PROCEDURE — 85730 THROMBOPLASTIN TIME PARTIAL: CPT | Performed by: EMERGENCY MEDICINE

## 2023-11-15 PROCEDURE — 80053 COMPREHEN METABOLIC PANEL: CPT | Performed by: EMERGENCY MEDICINE

## 2023-11-15 PROCEDURE — 85610 PROTHROMBIN TIME: CPT | Performed by: EMERGENCY MEDICINE

## 2023-11-15 PROCEDURE — 93005 ELECTROCARDIOGRAM TRACING: CPT | Performed by: EMERGENCY MEDICINE

## 2023-11-15 PROCEDURE — 82077 ASSAY SPEC XCP UR&BREATH IA: CPT | Performed by: EMERGENCY MEDICINE

## 2023-11-15 PROCEDURE — 85025 COMPLETE CBC W/AUTO DIFF WBC: CPT | Performed by: EMERGENCY MEDICINE

## 2023-11-15 RX ORDER — SODIUM CHLORIDE 0.9 % (FLUSH) 0.9 %
10 SYRINGE (ML) INJECTION AS NEEDED
Status: DISCONTINUED | OUTPATIENT
Start: 2023-11-15 | End: 2023-11-15 | Stop reason: HOSPADM

## 2023-11-15 NOTE — ED TRIAGE NOTES
To ER via EMS from Mr. DARREL Chavez.  Witnessed fall.  + ETOH.  Stumbled and fell abrasions to forehead.

## 2023-11-15 NOTE — ED PROVIDER NOTES
EMERGENCY DEPARTMENT ENCOUNTER    Room Number:  10/10  PCP: Provider, No Known  Historian: Patient/EMS report      HPI:  Chief Complaint: Head injury  A complete HPI/ROS/PMH/PSH/SH/FH are unobtainable due to: Alcohol intoxication  Context: Emmanuel Huizar is a 68 y.o. male who presents to the ED today following a fall with head trauma.  Per EMS reports as well as witnessed reports, the patient was drinking at a bar tonight when after he left stumbled and fell and struck his head on the ground.  It is unknown at this point whether he lost consciousness.  The patient does appear significantly intoxicated and has no recollection of the fall or the head injury.  He does report a history of atrial fibrillation currently.  He currently denies headache, neck pain, chest pain, shortness of breath, nausea/vomiting, or fever/chills.            PAST MEDICAL HISTORY  Active Ambulatory Problems     Diagnosis Date Noted    Syncope, unspecified syncope type 12/13/2022    SAH (subarachnoid hemorrhage) 12/14/2022    Traumatic subdural hemorrhage with loss of consciousness of 30 minutes or less 12/14/2022    Periorbital hematoma of left eye 12/14/2022    Closed head injury 12/14/2022    History of COVID-19 twice 12/14/2022    Persistent atrial fibrillation 03/13/2023    Encounter for monitoring sotalol therapy 04/18/2023    Orthostatic dizziness 04/18/2023     Resolved Ambulatory Problems     Diagnosis Date Noted    Atrial fibrillation 12/13/2022     Past Medical History:   Diagnosis Date    History of cardioversion-3/2023     Subdural hematoma--s/p syncope/fall- 12/2022          PAST SURGICAL HISTORY  Past Surgical History:   Procedure Laterality Date    HERNIA REPAIR Bilateral          FAMILY HISTORY  Family History   Problem Relation Age of Onset    Heart failure Father 63        cabg    Hypertension Father     Atrial fibrillation Brother         cardiac ablation    Atrial fibrillation Brother         cardiac ablationm          SOCIAL HISTORY  Social History     Socioeconomic History    Marital status:    Tobacco Use    Smoking status: Never     Passive exposure: Never    Smokeless tobacco: Never    Tobacco comments:     Daily caffeine - soda   Vaping Use    Vaping Use: Never used   Substance and Sexual Activity    Alcohol use: Yes     Alcohol/week: 7.0 standard drinks of alcohol     Types: 7 Shots of liquor per week     Comment: 7 drinks per week    Drug use: Never    Sexual activity: Defer         ALLERGIES  Patient has no known allergies.        REVIEW OF SYSTEMS  Review of Systems   Unable to perform ROS: Mental status change   Intoxicated      PHYSICAL EXAM  ED Triage Vitals [11/15/23 0109]   Temp Heart Rate Resp BP SpO2   97.1 °F (36.2 °C) 81 16 (!) 170/106 100 %      Temp src Heart Rate Source Patient Position BP Location FiO2 (%)   Tympanic -- -- -- --       Physical Exam  Constitutional:       General: He is not in acute distress.     Appearance: Normal appearance. He is not ill-appearing or toxic-appearing.   HENT:      Head: Normocephalic.      Comments: Scalp abrasions present  Eyes:      Extraocular Movements: Extraocular movements intact.      Pupils: Pupils are equal, round, and reactive to light.   Cardiovascular:      Rate and Rhythm: Normal rate. Rhythm irregularly irregular.      Heart sounds: No murmur heard.     No friction rub. No gallop.   Pulmonary:      Effort: Pulmonary effort is normal.      Breath sounds: Normal breath sounds.   Abdominal:      General: Abdomen is flat. There is no distension.      Palpations: Abdomen is soft.      Tenderness: There is no abdominal tenderness.   Musculoskeletal:         General: No swelling or tenderness. Normal range of motion.      Cervical back: Normal range of motion and neck supple.   Skin:     General: Skin is warm and dry.   Neurological:      General: No focal deficit present.      Mental Status: He is alert and oriented to person, place, and time.       Sensory: No sensory deficit.      Motor: No weakness.   Psychiatric:         Mood and Affect: Mood normal.         Behavior: Behavior normal.           Vital signs and nursing notes reviewed.          LAB RESULTS  Recent Results (from the past 24 hour(s))   Comprehensive Metabolic Panel    Collection Time: 11/15/23  1:39 AM    Specimen: Blood   Result Value Ref Range    Glucose 110 (H) 65 - 99 mg/dL    BUN 12 8 - 23 mg/dL    Creatinine 1.12 0.76 - 1.27 mg/dL    Sodium 141 136 - 145 mmol/L    Potassium 3.8 3.5 - 5.2 mmol/L    Chloride 103 98 - 107 mmol/L    CO2 24.6 22.0 - 29.0 mmol/L    Calcium 8.9 8.6 - 10.5 mg/dL    Total Protein 7.8 6.0 - 8.5 g/dL    Albumin 4.4 3.5 - 5.2 g/dL    ALT (SGPT) 9 1 - 41 U/L    AST (SGOT) 15 1 - 40 U/L    Alkaline Phosphatase 69 39 - 117 U/L    Total Bilirubin 1.1 0.0 - 1.2 mg/dL    Globulin 3.4 gm/dL    A/G Ratio 1.3 g/dL    BUN/Creatinine Ratio 10.7 7.0 - 25.0    Anion Gap 13.4 5.0 - 15.0 mmol/L    eGFR 72.0 >60.0 mL/min/1.73   Protime-INR    Collection Time: 11/15/23  1:39 AM    Specimen: Blood   Result Value Ref Range    Protime 13.9 11.7 - 14.2 Seconds    INR 1.06 0.90 - 1.10   aPTT    Collection Time: 11/15/23  1:39 AM    Specimen: Blood   Result Value Ref Range    PTT 25.0 22.7 - 35.4 seconds   Ethanol    Collection Time: 11/15/23  1:39 AM    Specimen: Blood   Result Value Ref Range    Ethanol 205 (H) 0 - 10 mg/dL    Ethanol % 0.205 %   CBC Auto Differential    Collection Time: 11/15/23  1:39 AM    Specimen: Blood   Result Value Ref Range    WBC 7.45 3.40 - 10.80 10*3/mm3    RBC 4.25 4.14 - 5.80 10*6/mm3    Hemoglobin 15.3 13.0 - 17.7 g/dL    Hematocrit 43.1 37.5 - 51.0 %    .4 (H) 79.0 - 97.0 fL    MCH 36.0 (H) 26.6 - 33.0 pg    MCHC 35.5 31.5 - 35.7 g/dL    RDW 13.0 12.3 - 15.4 %    RDW-SD 48.2 37.0 - 54.0 fl    MPV 10.6 6.0 - 12.0 fL    Platelets 353 140 - 450 10*3/mm3    Neutrophil % 48.0 42.7 - 76.0 %    Lymphocyte % 33.4 19.6 - 45.3 %    Monocyte % 9.5 5.0 - 12.0 %     Eosinophil % 7.4 (H) 0.3 - 6.2 %    Basophil % 1.3 0.0 - 1.5 %    Immature Grans % 0.4 0.0 - 0.5 %    Neutrophils, Absolute 3.57 1.70 - 7.00 10*3/mm3    Lymphocytes, Absolute 2.49 0.70 - 3.10 10*3/mm3    Monocytes, Absolute 0.71 0.10 - 0.90 10*3/mm3    Eosinophils, Absolute 0.55 (H) 0.00 - 0.40 10*3/mm3    Basophils, Absolute 0.10 0.00 - 0.20 10*3/mm3    Immature Grans, Absolute 0.03 0.00 - 0.05 10*3/mm3    nRBC 0.0 0.0 - 0.2 /100 WBC   ECG 12 Lead Other; possible syncope    Collection Time: 11/15/23  1:47 AM   Result Value Ref Range    QT Interval 448 ms    QTC Interval 542 ms       Ordered the above labs and reviewed the results.        RADIOLOGY  CT Head Without Contrast    Result Date: 11/15/2023  CT OF THE HEAD WITHOUT CONTRAST  HISTORY: Fall  COMPARISON: January 5, 2023  TECHNIQUE: Axial CT imaging was obtained through the brain. No IV contrast was administered.  FINDINGS: No acute intracranial hemorrhage is seen. There is atrophy. There is periventricular and deep white matter microangiopathic change. There is no midline shift or mass effect. No calvarial fracture is seen. There is mucosal thickening within the ethmoid sinuses.      No acute intracranial findings.  Radiation dose reduction techniques were utilized, including automated exposure control and exposure modulation based on body size.   This report was finalized on 11/15/2023 3:16 AM by Dr. Nae Campo M.D on Workstation: [id number]       Ordered the above noted radiological studies. Reviewed by me in PACS.            PROCEDURES  Procedures    EKG independently interpreted by myself as follows:    EKG          EKG time: 0147  Rhythm/Rate: atrial fibrillation, 88  P waves and OR: absent  QRS, axis: nml, nml   ST and T waves: nml     Interpreted Contemporaneously by me, independently viewed  changed compared to prior 3/15/23              MEDICATIONS GIVEN IN ER  Medications - No data to display                  MEDICAL DECISION MAKING,  PROGRESS, and CONSULTS    All labs have been independently reviewed by me.  All radiology studies have been reviewed by me and I have also reviewed the radiology report.   EKG's independently viewed and interpreted by me.  Discussion below represents my analysis of pertinent findings related to patient's condition, differential diagnosis, treatment plan and final disposition.      Additional sources:  - Discussed/ obtained information from independent historians: History obtained from the EMS report as well as the patient himself at bedside.    - External (non-ED) record review: Upon medical records review, the patient was last seen and evaluated in the outpatient office of cardiology on 10/13/2023 secondary to persistent atrial fibrillation.    - Chronic or social conditions impacting care: Anticoagulation dependent atrial fibrillation    - Shared decision making: Discharge decision based on shared conversations have between myself as well as the patient and the patient's family present at bedside.      Orders placed during this visit:  Orders Placed This Encounter   Procedures    CT Head Without Contrast    Comprehensive Metabolic Panel    Protime-INR    aPTT    Ethanol    CBC Auto Differential    ECG 12 Lead Other; possible syncope    CBC & Differential             Differential diagnosis includes but is not limited to:    Differential diagnosis includes but is not limited to: Atrial fibrillation, ventricular tachydysrhythmia, long QT syndrome, severe electrolyte disturbance, alcohol intoxication, closed head injury, scalp abrasion, skull fracture, or intracranial hemorrhage.      Independent interpretation of labs, radiology studies, and discussions with consultants:    CT scan of the patient's head was independently interpreted by myself with my interpretation showing no skull fracture nor area of hemorrhage, ischemia/infarct, or mass effect.        ED Course as of 11/15/23 0633   Wed Nov 15, 2023   0339 On  reevaluation, the patient is resting comfortably and reports that he has no current physical complaints.  His spouse is present and will take him home.  I did inform them that his work-up is unremarkable minus the serum EtOH level being elevated.  At this point, the patient is stable for discharge and all questions have been answered. [BM]      ED Course User Index  [BM] Adriel Mustafa MD             DIAGNOSIS  Final diagnoses:   Fall from standing, initial encounter   Closed head injury, initial encounter   Alcoholic intoxication without complication   Longstanding persistent atrial fibrillation         DISPOSITION  DISCHARGE    Patient discharged in stable condition.    Reviewed implications of results, diagnosis, meds, responsibility to follow up, warning signs and symptoms of possible worsening, potential complications and reasons to return to ER.    Patient/Family voiced understanding of above instructions.    Discussed plan for discharge, as there is no emergent indication for admission. Patient referred to primary care provider for BP management due to today's BP. Pt/family is agreeable and understands need for follow up and repeat testing.  Pt is aware that discharge does not mean that nothing is wrong but it indicates no emergency is present that requires admission and they must continue care with follow-up as given below or physician of their choice.     FOLLOW-UP  [facility]  [address]  [address]  [facility] [id number] [phone]  Schedule an appointment as soon as possible for a visit            Medication List      No changes were made to your prescriptions during this visit.                   Latest Documented Vital Signs:  As of 06:33 EST  BP- 127/84 HR- 86 Temp- 97.1 °F (36.2 °C) (Tympanic) O2 sat- 96%              --    Please note that portions of this were completed with a voice recognition program.       Note Disclaimer: At [facility], we  believe that sharing information builds trust and better relationships. You are receiving this note because you are receiving care at [facility] or recently visited. It is possible you will see health information before a provider has talked with you about it. This kind of information can be easy to misunderstand. To help you fully understand what it means for your health, we urge you to discuss this note with your provider.             Adriel Mustafa MD  11/15/23 0676

## 2024-04-15 RX ORDER — SOTALOL HYDROCHLORIDE 160 MG/1
160 TABLET ORAL EVERY 12 HOURS
Qty: 60 TABLET | Refills: 5 | Status: SHIPPED | OUTPATIENT
Start: 2024-04-15

## 2024-11-08 RX ORDER — SOTALOL HYDROCHLORIDE 160 MG/1
160 TABLET ORAL EVERY 12 HOURS
Qty: 60 TABLET | Refills: 5 | Status: SHIPPED | OUTPATIENT
Start: 2024-11-08

## 2024-11-08 NOTE — TELEPHONE ENCOUNTER
Caller: Bhupendra Emmanuel SURI    Relationship: Self    Best call back number:  [phone]    Requested Prescriptions:   Requested Prescriptions     Pending Prescriptions Disp Refills    sotalol (BETAPACE) 160 MG tablet 60 tablet 5     Sig: Take 1 tablet by mouth Every 12 (Twelve) Hours.        Pharmacy where request should be sent: [facility]     Last office visit with prescribing clinician: 10/13/2023   Last telemedicine visit with prescribing clinician: Visit date not found   Next office visit with prescribing clinician: Visit date not found     Additional details provided by patient:      Does the patient have less than a 3 day supply:  [x] Yes  [] No    Would you like a call back once the refill request has been completed: [] Yes [] No    If the office needs to give you a call back, can they leave a voicemail: [] Yes [] No    Karlene Murphy Rep   11/08/24 13:45 EST

## 2025-06-04 RX ORDER — SOTALOL HYDROCHLORIDE 160 MG/1
160 TABLET ORAL EVERY 12 HOURS
Qty: 60 TABLET | Refills: 5 | OUTPATIENT
Start: 2025-06-04

## 2025-06-09 RX ORDER — SOTALOL HYDROCHLORIDE 160 MG/1
160 TABLET ORAL EVERY 12 HOURS
Qty: 60 TABLET | Refills: 5 | OUTPATIENT
Start: 2025-06-09

## 2025-06-12 RX ORDER — SOTALOL HYDROCHLORIDE 160 MG/1
160 TABLET ORAL EVERY 12 HOURS
Qty: 60 TABLET | Refills: 2 | Status: SHIPPED | OUTPATIENT
Start: 2025-06-12 | End: 2025-06-12 | Stop reason: SDUPTHER

## 2025-06-12 RX ORDER — SOTALOL HYDROCHLORIDE 160 MG/1
160 TABLET ORAL EVERY 12 HOURS
Qty: 60 TABLET | Refills: 2 | Status: SHIPPED | OUTPATIENT
Start: 2025-06-12